# Patient Record
Sex: MALE | Race: WHITE | NOT HISPANIC OR LATINO | Employment: OTHER | URBAN - METROPOLITAN AREA
[De-identification: names, ages, dates, MRNs, and addresses within clinical notes are randomized per-mention and may not be internally consistent; named-entity substitution may affect disease eponyms.]

---

## 2023-01-20 PROBLEM — E11.9 DIABETES MELLITUS (MULTI): Status: ACTIVE | Noted: 2023-01-20

## 2023-01-20 PROBLEM — E78.5 HYPERLIPIDEMIA: Status: ACTIVE | Noted: 2023-01-20

## 2023-01-20 PROBLEM — M10.9 GOUT: Status: ACTIVE | Noted: 2023-01-20

## 2023-01-20 PROBLEM — M25.511 RIGHT SHOULDER PAIN: Status: ACTIVE | Noted: 2023-01-20

## 2023-01-20 PROBLEM — E66.9 OBESITY: Status: ACTIVE | Noted: 2023-01-20

## 2023-01-20 PROBLEM — I10 ESSENTIAL HYPERTENSION: Status: ACTIVE | Noted: 2023-01-20

## 2023-01-20 PROBLEM — B37.9 YEAST INFECTION: Status: ACTIVE | Noted: 2023-01-20

## 2023-01-20 PROBLEM — N53.19 DISORDER OF EJACULATION: Status: ACTIVE | Noted: 2023-01-20

## 2023-01-20 RX ORDER — LISINOPRIL AND HYDROCHLOROTHIAZIDE 10; 12.5 MG/1; MG/1
1.5 TABLET ORAL DAILY
COMMUNITY
Start: 2019-07-23 | End: 2023-07-10 | Stop reason: SDUPTHER

## 2023-01-20 RX ORDER — FLAXSEED OIL 1000 MG
1 CAPSULE ORAL 3 TIMES DAILY
COMMUNITY
Start: 2019-07-23

## 2023-01-20 RX ORDER — ALLOPURINOL 300 MG/1
300 TABLET ORAL DAILY
COMMUNITY
Start: 2019-07-23 | End: 2023-10-04

## 2023-01-20 RX ORDER — METFORMIN HYDROCHLORIDE 500 MG/1
1 TABLET ORAL 2 TIMES DAILY
COMMUNITY
Start: 2019-08-22 | End: 2023-03-27 | Stop reason: SDUPTHER

## 2023-01-20 RX ORDER — GLUCOSAMINE/MSM/CHONDROIT SULF 500-166.6
1 TABLET ORAL
COMMUNITY
Start: 2019-07-23

## 2023-01-20 RX ORDER — ATORVASTATIN CALCIUM 10 MG/1
10 TABLET, FILM COATED ORAL DAILY
COMMUNITY
Start: 2019-08-05 | End: 2023-05-08

## 2023-03-27 DIAGNOSIS — E11.9 TYPE 2 DIABETES MELLITUS WITHOUT COMPLICATION, WITHOUT LONG-TERM CURRENT USE OF INSULIN (MULTI): ICD-10-CM

## 2023-03-27 RX ORDER — METFORMIN HYDROCHLORIDE 500 MG/1
500 TABLET ORAL 2 TIMES DAILY
Qty: 180 TABLET | Refills: 3 | Status: SHIPPED | OUTPATIENT
Start: 2023-03-27 | End: 2024-04-29

## 2023-05-06 DIAGNOSIS — E78.5 HYPERLIPIDEMIA, UNSPECIFIED: ICD-10-CM

## 2023-05-08 RX ORDER — ATORVASTATIN CALCIUM 10 MG/1
10 TABLET, FILM COATED ORAL DAILY
Qty: 90 TABLET | Refills: 3 | Status: SHIPPED | OUTPATIENT
Start: 2023-05-08 | End: 2024-05-06

## 2023-07-10 DIAGNOSIS — I10 ESSENTIAL HYPERTENSION: ICD-10-CM

## 2023-07-10 RX ORDER — LISINOPRIL AND HYDROCHLOROTHIAZIDE 10; 12.5 MG/1; MG/1
1.5 TABLET ORAL DAILY
Qty: 45 TABLET | Refills: 0 | Status: SHIPPED | OUTPATIENT
Start: 2023-07-10 | End: 2023-07-10 | Stop reason: SDUPTHER

## 2023-07-10 RX ORDER — LISINOPRIL AND HYDROCHLOROTHIAZIDE 10; 12.5 MG/1; MG/1
1.5 TABLET ORAL DAILY
Qty: 45 TABLET | Refills: 0 | Status: SHIPPED | OUTPATIENT
Start: 2023-07-10 | End: 2023-08-10 | Stop reason: SDUPTHER

## 2023-08-08 ENCOUNTER — CLINICAL SUPPORT (OUTPATIENT)
Dept: PRIMARY CARE | Facility: CLINIC | Age: 58
End: 2023-08-08
Payer: COMMERCIAL

## 2023-08-08 DIAGNOSIS — I10 BENIGN ESSENTIAL HYPERTENSION: ICD-10-CM

## 2023-08-08 DIAGNOSIS — I10 ESSENTIAL HYPERTENSION: ICD-10-CM

## 2023-08-08 DIAGNOSIS — E03.9 HYPOTHYROIDISM, UNSPECIFIED TYPE: ICD-10-CM

## 2023-08-08 DIAGNOSIS — E11.69 TYPE 2 DIABETES MELLITUS WITH OTHER SPECIFIED COMPLICATION, UNSPECIFIED WHETHER LONG TERM INSULIN USE (MULTI): ICD-10-CM

## 2023-08-08 DIAGNOSIS — D50.9 IRON DEFICIENCY ANEMIA, UNSPECIFIED IRON DEFICIENCY ANEMIA TYPE: ICD-10-CM

## 2023-08-08 LAB
ALANINE AMINOTRANSFERASE (SGPT) (U/L) IN SER/PLAS: 23 U/L (ref 10–52)
ALBUMIN (G/DL) IN SER/PLAS: 4.2 G/DL (ref 3.4–5)
ALKALINE PHOSPHATASE (U/L) IN SER/PLAS: 58 U/L (ref 33–136)
ANION GAP IN SER/PLAS: 13 MMOL/L (ref 10–20)
ASPARTATE AMINOTRANSFERASE (SGOT) (U/L) IN SER/PLAS: 20 U/L (ref 9–39)
BILIRUBIN TOTAL (MG/DL) IN SER/PLAS: 0.5 MG/DL (ref 0–1.2)
CALCIUM (MG/DL) IN SER/PLAS: 9.1 MG/DL (ref 8.6–10.6)
CARBON DIOXIDE, TOTAL (MMOL/L) IN SER/PLAS: 28 MMOL/L (ref 21–32)
CHLORIDE (MMOL/L) IN SER/PLAS: 101 MMOL/L (ref 98–107)
CREATININE (MG/DL) IN SER/PLAS: 1.16 MG/DL (ref 0.5–1.3)
ESTIMATED AVERAGE GLUCOSE FOR HBA1C: 117 MG/DL
GFR MALE: 72 ML/MIN/1.73M2
GLUCOSE (MG/DL) IN SER/PLAS: 95 MG/DL (ref 74–99)
HEMOGLOBIN A1C/HEMOGLOBIN TOTAL IN BLOOD: 5.7 %
POTASSIUM (MMOL/L) IN SER/PLAS: 4.1 MMOL/L (ref 3.5–5.3)
PROTEIN TOTAL: 6.5 G/DL (ref 6.4–8.2)
SODIUM (MMOL/L) IN SER/PLAS: 138 MMOL/L (ref 136–145)
UREA NITROGEN (MG/DL) IN SER/PLAS: 20 MG/DL (ref 6–23)

## 2023-08-08 PROCEDURE — 83036 HEMOGLOBIN GLYCOSYLATED A1C: CPT

## 2023-08-08 PROCEDURE — 80053 COMPREHEN METABOLIC PANEL: CPT

## 2023-08-08 PROCEDURE — 84550 ASSAY OF BLOOD/URIC ACID: CPT

## 2023-08-10 ENCOUNTER — OFFICE VISIT (OUTPATIENT)
Dept: PRIMARY CARE | Facility: CLINIC | Age: 58
End: 2023-08-10
Payer: COMMERCIAL

## 2023-08-10 VITALS
RESPIRATION RATE: 14 BRPM | OXYGEN SATURATION: 96 % | HEART RATE: 60 BPM | DIASTOLIC BLOOD PRESSURE: 64 MMHG | HEIGHT: 68 IN | BODY MASS INDEX: 32.89 KG/M2 | WEIGHT: 217 LBS | SYSTOLIC BLOOD PRESSURE: 112 MMHG

## 2023-08-10 DIAGNOSIS — E11.9 TYPE 2 DIABETES MELLITUS WITHOUT COMPLICATION, WITHOUT LONG-TERM CURRENT USE OF INSULIN (MULTI): ICD-10-CM

## 2023-08-10 DIAGNOSIS — I10 ESSENTIAL HYPERTENSION: ICD-10-CM

## 2023-08-10 DIAGNOSIS — M79.10 MUSCLE PAIN: Primary | ICD-10-CM

## 2023-08-10 DIAGNOSIS — E78.49 OTHER HYPERLIPIDEMIA: ICD-10-CM

## 2023-08-10 PROBLEM — M10.9 GOUT, UNSPECIFIED: Status: ACTIVE | Noted: 2023-08-10

## 2023-08-10 LAB
POC FINGERSTICK BLOOD GLUCOSE: 93 MG/DL (ref 70–100)
URATE (MG/DL) IN SER/PLAS: 5.5 MG/DL (ref 4–7.5)

## 2023-08-10 PROCEDURE — 82962 GLUCOSE BLOOD TEST: CPT | Performed by: INTERNAL MEDICINE

## 2023-08-10 PROCEDURE — 3044F HG A1C LEVEL LT 7.0%: CPT | Performed by: INTERNAL MEDICINE

## 2023-08-10 PROCEDURE — 99214 OFFICE O/P EST MOD 30 MIN: CPT | Performed by: INTERNAL MEDICINE

## 2023-08-10 PROCEDURE — 1036F TOBACCO NON-USER: CPT | Performed by: INTERNAL MEDICINE

## 2023-08-10 PROCEDURE — 3074F SYST BP LT 130 MM HG: CPT | Performed by: INTERNAL MEDICINE

## 2023-08-10 PROCEDURE — 3078F DIAST BP <80 MM HG: CPT | Performed by: INTERNAL MEDICINE

## 2023-08-10 RX ORDER — DICLOFENAC SODIUM 10 MG/G
4 GEL TOPICAL 4 TIMES DAILY
Qty: 100 G | Refills: 1 | Status: ON HOLD | OUTPATIENT
Start: 2023-08-10 | End: 2024-05-10 | Stop reason: ALTCHOICE

## 2023-08-10 RX ORDER — LISINOPRIL AND HYDROCHLOROTHIAZIDE 10; 12.5 MG/1; MG/1
1.5 TABLET ORAL DAILY
Qty: 135 TABLET | Refills: 0 | Status: SHIPPED | OUTPATIENT
Start: 2023-08-10 | End: 2023-12-12

## 2023-08-10 ASSESSMENT — PAIN SCALES - GENERAL: PAINLEVEL: 6

## 2023-08-10 NOTE — PROGRESS NOTES
"Subjective   Patient ID: Delfino Lopez is a 58 y.o. male who presents for Groin Injury (After lifting 3 months ago, still in pain).    HPI   Pt is 59 yo male coming to review blood work and concerns regarding right sided groin pain after lifting something heavy 3 months ago. Pt states pain is reproducible when he lies down flat and sometimes when he walks or lifts heavy things. He does not feel any new bulges or extreme pain. He has not had any recent gout flare ups. He takes advil sometimes for the gout if it does flare up and for the groin pain which helps. Hgb A1c is at 5.7% today pt was worried it would be higher because he forgot to take his Metformin at night for a few days a couple weeks ago due to schedule changes.     Review of Systems   Musculoskeletal:  Positive for gait problem.   All other systems reviewed and are negative.        Objective   /64 (BP Location: Right arm, Patient Position: Sitting, BP Cuff Size: Adult)   Pulse 60   Resp 14   Ht 1.727 m (5' 8\")   Wt 98.4 kg (217 lb)   SpO2 96%   BMI 32.99 kg/m²     Physical Exam  Pt's gait is altered due to groin pain. There is no pain upon palpitation of the rt groin or the rt abd. Pain reproducible while pt lying flat and upon leg extension.     Assessment/Plan   Diagnoses and all orders for this visit:  Muscle pain  Voltaren gel 40 mg on rt groin   Type 2 diabetes mellitus without complication, without long-term current use of insulin (CMS/[facility])  -     POCT Fingerstick Glucose manually resulted  Essential hypertension  Lisinopril refilled for 3 months    Annual physical scheduled for November.       "

## 2023-11-09 ENCOUNTER — CLINICAL SUPPORT (OUTPATIENT)
Dept: PRIMARY CARE | Facility: CLINIC | Age: 58
End: 2023-11-09
Payer: COMMERCIAL

## 2023-11-09 DIAGNOSIS — E78.5 HYPERLIPIDEMIA, UNSPECIFIED HYPERLIPIDEMIA TYPE: ICD-10-CM

## 2023-11-09 DIAGNOSIS — M10.9 GOUT, UNSPECIFIED CAUSE, UNSPECIFIED CHRONICITY, UNSPECIFIED SITE: ICD-10-CM

## 2023-11-09 DIAGNOSIS — I10 BENIGN ESSENTIAL HYPERTENSION: ICD-10-CM

## 2023-11-09 DIAGNOSIS — I10 PRIMARY HYPERTENSION: ICD-10-CM

## 2023-11-09 DIAGNOSIS — E11.69 TYPE 2 DIABETES MELLITUS WITH OTHER SPECIFIED COMPLICATION, UNSPECIFIED WHETHER LONG TERM INSULIN USE (MULTI): ICD-10-CM

## 2023-11-09 DIAGNOSIS — E03.9 HYPOTHYROIDISM, UNSPECIFIED TYPE: ICD-10-CM

## 2023-11-09 DIAGNOSIS — E78.00 ELEVATED LDL CHOLESTEROL LEVEL: ICD-10-CM

## 2023-11-09 DIAGNOSIS — Z12.5 SCREENING PSA (PROSTATE SPECIFIC ANTIGEN): ICD-10-CM

## 2023-11-09 DIAGNOSIS — D50.9 IRON DEFICIENCY ANEMIA, UNSPECIFIED IRON DEFICIENCY ANEMIA TYPE: ICD-10-CM

## 2023-11-09 DIAGNOSIS — I10 ESSENTIAL HYPERTENSION: ICD-10-CM

## 2023-11-09 DIAGNOSIS — Z00.00 ENCOUNTER FOR ANNUAL PHYSICAL EXAM: ICD-10-CM

## 2023-11-09 LAB
25(OH)D3 SERPL-MCNC: 69 NG/ML (ref 30–100)
ALBUMIN SERPL BCP-MCNC: 4.3 G/DL (ref 3.4–5)
ALP SERPL-CCNC: 52 U/L (ref 33–136)
ALT SERPL W P-5'-P-CCNC: 27 U/L (ref 10–52)
ANION GAP SERPL CALC-SCNC: 13 MMOL/L (ref 10–20)
AST SERPL W P-5'-P-CCNC: 23 U/L (ref 9–39)
BILIRUB SERPL-MCNC: 0.4 MG/DL (ref 0–1.2)
BUN SERPL-MCNC: 26 MG/DL (ref 6–23)
CALCIUM SERPL-MCNC: 9.3 MG/DL (ref 8.6–10.6)
CHLORIDE SERPL-SCNC: 100 MMOL/L (ref 98–107)
CHOLEST SERPL-MCNC: 125 MG/DL (ref 0–199)
CHOLESTEROL/HDL RATIO: 2.9
CO2 SERPL-SCNC: 30 MMOL/L (ref 21–32)
CREAT SERPL-MCNC: 1.18 MG/DL (ref 0.5–1.3)
ERYTHROCYTE [DISTWIDTH] IN BLOOD BY AUTOMATED COUNT: 14.2 % (ref 11.5–14.5)
EST. AVERAGE GLUCOSE BLD GHB EST-MCNC: 123 MG/DL
GFR SERPL CREATININE-BSD FRML MDRD: 71 ML/MIN/1.73M*2
GLUCOSE SERPL-MCNC: 86 MG/DL (ref 74–99)
HBA1C MFR BLD: 5.9 %
HCT VFR BLD AUTO: 46.4 % (ref 41–52)
HDLC SERPL-MCNC: 43.5 MG/DL
HGB BLD-MCNC: 14.7 G/DL (ref 13.5–17.5)
LDLC SERPL CALC-MCNC: 53 MG/DL
MCH RBC QN AUTO: 28.4 PG (ref 26–34)
MCHC RBC AUTO-ENTMCNC: 31.7 G/DL (ref 32–36)
MCV RBC AUTO: 90 FL (ref 80–100)
NON HDL CHOLESTEROL: 82 MG/DL (ref 0–149)
NRBC BLD-RTO: 0 /100 WBCS (ref 0–0)
PLATELET # BLD AUTO: 232 X10*3/UL (ref 150–450)
POTASSIUM SERPL-SCNC: 4.4 MMOL/L (ref 3.5–5.3)
PROT SERPL-MCNC: 6.9 G/DL (ref 6.4–8.2)
PSA SERPL-MCNC: 1.56 NG/ML
RBC # BLD AUTO: 5.18 X10*6/UL (ref 4.5–5.9)
SODIUM SERPL-SCNC: 139 MMOL/L (ref 136–145)
TRIGL SERPL-MCNC: 143 MG/DL (ref 0–149)
TSH SERPL-ACNC: 2.56 MIU/L (ref 0.44–3.98)
URATE SERPL-MCNC: 4.9 MG/DL (ref 4–7.5)
VLDL: 29 MG/DL (ref 0–40)
WBC # BLD AUTO: 7.6 X10*3/UL (ref 4.4–11.3)

## 2023-11-09 PROCEDURE — 85027 COMPLETE CBC AUTOMATED: CPT

## 2023-11-09 PROCEDURE — 84550 ASSAY OF BLOOD/URIC ACID: CPT

## 2023-11-09 PROCEDURE — 80053 COMPREHEN METABOLIC PANEL: CPT

## 2023-11-09 PROCEDURE — 36415 COLL VENOUS BLD VENIPUNCTURE: CPT

## 2023-11-09 PROCEDURE — 83036 HEMOGLOBIN GLYCOSYLATED A1C: CPT

## 2023-11-09 PROCEDURE — 84153 ASSAY OF PSA TOTAL: CPT

## 2023-11-09 PROCEDURE — 80061 LIPID PANEL: CPT

## 2023-11-09 PROCEDURE — 82306 VITAMIN D 25 HYDROXY: CPT

## 2023-11-09 PROCEDURE — 84443 ASSAY THYROID STIM HORMONE: CPT

## 2023-11-28 ENCOUNTER — OFFICE VISIT (OUTPATIENT)
Dept: PRIMARY CARE | Facility: CLINIC | Age: 58
End: 2023-11-28
Payer: COMMERCIAL

## 2023-11-28 VITALS
HEIGHT: 67 IN | HEART RATE: 70 BPM | SYSTOLIC BLOOD PRESSURE: 116 MMHG | OXYGEN SATURATION: 94 % | BODY MASS INDEX: 35.16 KG/M2 | RESPIRATION RATE: 14 BRPM | DIASTOLIC BLOOD PRESSURE: 77 MMHG | WEIGHT: 224 LBS

## 2023-11-28 DIAGNOSIS — E66.01 CLASS 2 SEVERE OBESITY DUE TO EXCESS CALORIES WITH SERIOUS COMORBIDITY IN ADULT, UNSPECIFIED BMI (MULTI): ICD-10-CM

## 2023-11-28 DIAGNOSIS — R10.31 GROIN PAIN, RIGHT: ICD-10-CM

## 2023-11-28 DIAGNOSIS — E08.00 DIABETES MELLITUS DUE TO UNDERLYING CONDITION WITH HYPEROSMOLARITY WITHOUT COMA, WITHOUT LONG-TERM CURRENT USE OF INSULIN (MULTI): Primary | ICD-10-CM

## 2023-11-28 DIAGNOSIS — Z23 FLU VACCINE NEED: ICD-10-CM

## 2023-11-28 DIAGNOSIS — M10.00 ACUTE IDIOPATHIC GOUT, UNSPECIFIED SITE: ICD-10-CM

## 2023-11-28 DIAGNOSIS — E78.49 OTHER HYPERLIPIDEMIA: ICD-10-CM

## 2023-11-28 DIAGNOSIS — I10 ESSENTIAL HYPERTENSION: ICD-10-CM

## 2023-11-28 DIAGNOSIS — M79.10 MUSCLE PAIN: ICD-10-CM

## 2023-11-28 DIAGNOSIS — Z00.00 ANNUAL PHYSICAL EXAM: ICD-10-CM

## 2023-11-28 LAB
POC APPEARANCE, URINE: CLEAR
POC BILIRUBIN, URINE: NEGATIVE
POC BLOOD, URINE: NEGATIVE
POC COLOR, URINE: YELLOW
POC FECAL OCCULT BLOOD: NEGATIVE
POC FINGERSTICK BLOOD GLUCOSE: 104 MG/DL (ref 70–100)
POC GLUCOSE, URINE: NEGATIVE MG/DL
POC KETONES, URINE: NEGATIVE MG/DL
POC LEUKOCYTES, URINE: NEGATIVE
POC NITRITE,URINE: NEGATIVE
POC PH, URINE: 6 PH
POC PROTEIN, URINE: NEGATIVE MG/DL
POC SPECIFIC GRAVITY, URINE: 1.01
POC UROBILINOGEN, URINE: 0.2 EU/DL

## 2023-11-28 PROCEDURE — 3078F DIAST BP <80 MM HG: CPT | Performed by: INTERNAL MEDICINE

## 2023-11-28 PROCEDURE — 90686 IIV4 VACC NO PRSV 0.5 ML IM: CPT | Performed by: INTERNAL MEDICINE

## 2023-11-28 PROCEDURE — 82270 OCCULT BLOOD FECES: CPT | Performed by: INTERNAL MEDICINE

## 2023-11-28 PROCEDURE — 93000 ELECTROCARDIOGRAM COMPLETE: CPT | Performed by: INTERNAL MEDICINE

## 2023-11-28 PROCEDURE — 99214 OFFICE O/P EST MOD 30 MIN: CPT | Performed by: INTERNAL MEDICINE

## 2023-11-28 PROCEDURE — 90471 IMMUNIZATION ADMIN: CPT | Performed by: INTERNAL MEDICINE

## 2023-11-28 PROCEDURE — 3044F HG A1C LEVEL LT 7.0%: CPT | Performed by: INTERNAL MEDICINE

## 2023-11-28 PROCEDURE — 99396 PREV VISIT EST AGE 40-64: CPT | Performed by: INTERNAL MEDICINE

## 2023-11-28 PROCEDURE — 81002 URINALYSIS NONAUTO W/O SCOPE: CPT | Performed by: INTERNAL MEDICINE

## 2023-11-28 PROCEDURE — 82962 GLUCOSE BLOOD TEST: CPT | Performed by: INTERNAL MEDICINE

## 2023-11-28 PROCEDURE — 3074F SYST BP LT 130 MM HG: CPT | Performed by: INTERNAL MEDICINE

## 2023-11-28 PROCEDURE — 1036F TOBACCO NON-USER: CPT | Performed by: INTERNAL MEDICINE

## 2023-11-28 PROCEDURE — 3048F LDL-C <100 MG/DL: CPT | Performed by: INTERNAL MEDICINE

## 2023-11-28 ASSESSMENT — ENCOUNTER SYMPTOMS
DEPRESSION: 0
LOSS OF SENSATION IN FEET: 0
OCCASIONAL FEELINGS OF UNSTEADINESS: 0

## 2023-11-28 ASSESSMENT — LIFESTYLE VARIABLES
SKIP TO QUESTIONS 9-10: 1
AUDIT-C TOTAL SCORE: 0
HOW OFTEN DO YOU HAVE SIX OR MORE DRINKS ON ONE OCCASION: NEVER
HOW OFTEN DO YOU HAVE A DRINK CONTAINING ALCOHOL: NEVER
HOW MANY STANDARD DRINKS CONTAINING ALCOHOL DO YOU HAVE ON A TYPICAL DAY: PATIENT DOES NOT DRINK

## 2023-11-28 ASSESSMENT — PATIENT HEALTH QUESTIONNAIRE - PHQ9
SUM OF ALL RESPONSES TO PHQ9 QUESTIONS 1 AND 2: 0
2. FEELING DOWN, DEPRESSED OR HOPELESS: NOT AT ALL
1. LITTLE INTEREST OR PLEASURE IN DOING THINGS: NOT AT ALL
SUM OF ALL RESPONSES TO PHQ9 QUESTIONS 1 & 2: 0
2. FEELING DOWN, DEPRESSED OR HOPELESS: NOT AT ALL
1. LITTLE INTEREST OR PLEASURE IN DOING THINGS: NOT AT ALL

## 2023-11-28 ASSESSMENT — COLUMBIA-SUICIDE SEVERITY RATING SCALE - C-SSRS
1. IN THE PAST MONTH, HAVE YOU WISHED YOU WERE DEAD OR WISHED YOU COULD GO TO SLEEP AND NOT WAKE UP?: NO
2. HAVE YOU ACTUALLY HAD ANY THOUGHTS OF KILLING YOURSELF?: NO
6. HAVE YOU EVER DONE ANYTHING, STARTED TO DO ANYTHING, OR PREPARED TO DO ANYTHING TO END YOUR LIFE?: NO

## 2023-11-28 ASSESSMENT — PAIN SCALES - GENERAL: PAINLEVEL: 6

## 2023-11-28 NOTE — ASSESSMENT & PLAN NOTE
Patient has this pain in the right groin area going down the thigh and he had it for quite some time over 9 months not getting any better every time he moves around to climb the stairs it hurts and he is worried about something going like an injury to his tendon since he has a lot of heavy weight lifting so patient will be sent for MRI of the right groin area.

## 2023-11-28 NOTE — PROGRESS NOTES
"ANNUAL WELLNESS VISIT    Subjective :  Chief Complaint: Delfino Lopez is an 58 y.o. male here for an annual wellness visit and general medical care and f/u.     HPI:  Follow-up annual wellness exam        Objective   /77 (BP Location: Right arm, Patient Position: Sitting, BP Cuff Size: Large adult)   Pulse 70   Resp 14   Ht 1.702 m (5' 7\")   Wt 102 kg (224 lb)   SpO2 94%   BMI 35.08 kg/m²     Physical Exam  Vitals reviewed.   Constitutional:       Appearance: Normal appearance.   HENT:      Head: Normocephalic and atraumatic.   Cardiovascular:      Rate and Rhythm: Normal rate and regular rhythm.   Pulmonary:      Effort: Pulmonary effort is normal.      Breath sounds: Normal breath sounds.   Abdominal:      General: Bowel sounds are normal.      Palpations: Abdomen is soft.   Musculoskeletal:      Cervical back: Neck supple.   Skin:     General: Skin is warm and dry.   Neurological:      General: No focal deficit present.      Mental Status: He is alert.   Psychiatric:         Mood and Affect: Mood normal.         Behavior: Behavior is cooperative.         Imaging:  No results found.     Labs reviewed:    Lab Results   Component Value Date    WBC 7.6 11/09/2023    HGB 14.7 11/09/2023    HCT 46.4 11/09/2023     11/09/2023    CHOL 125 11/09/2023    TRIG 143 11/09/2023    HDL 43.5 11/09/2023    ALT 27 11/09/2023    AST 23 11/09/2023     11/09/2023    K 4.4 11/09/2023     11/09/2023    CREATININE 1.18 11/09/2023    BUN 26 (H) 11/09/2023    CO2 30 11/09/2023    TSH 2.56 11/09/2023    PSA 0.74 09/24/2020    HGBA1C 5.9 (H) 11/09/2023       Past Medical, Surgical, and Family History reviewed and updated in chart.    I have reviewed and reconciled the medication list with the patient today.   Current Outpatient Medications:     allopurinol (Zyloprim) 300 mg tablet, TAKE 1 TABLET BY MOUTH EVERY DAY, Disp: 90 tablet, Rfl: 3    atorvastatin (Lipitor) 10 mg tablet, Take 1 tablet (10 mg) by mouth " once daily., Disp: 90 tablet, Rfl: 3    diclofenac sodium (Voltaren) 1 % gel gel, Apply 1 Application topically 4 times a day., Disp: 100 g, Rfl: 1    flaxseed oiL 1,000 mg capsule, Take 1 capsule (1,000 mg) by mouth 3 times a day., Disp: , Rfl:     lisinopriL-hydrochlorothiazide 10-12.5 mg tablet, Take 1.5 tablets by mouth once daily. Take 1.5 tablet daily, Disp: 135 tablet, Rfl: 0    metFORMIN (Glucophage) 500 mg tablet, Take 1 tablet (500 mg) by mouth in the morning and 1 tablet (500 mg) before bedtime., Disp: 180 tablet, Rfl: 3    multivitamin/iron/folic acid (CENTRUM ORAL), Take by mouth., Disp: , Rfl:     omega 3-dha-epa-fish oil 360 mg-108 mg- 180 mg-1,200 mg capsule, Take 1 capsule by mouth 3 times a day with meals., Disp: , Rfl:     turmeric (CURCUMIN MISC), Take by mouth. TUMERIC CURCUMIN ORAL CAPSULE, Disp: , Rfl:      List of current healthcare providers:  Patient Care Team:  Liu Maxwell MD as PCP - General  Liu Maxwell MD as PCP - [facility] PCP  Liu Maxwell MD as PCP - [facility] Medicaid PCP     HRA:  Over the past 2 weeks, how often have you been bothered by any of the following problems?  Little interest or pleasure in doing things: Not at all  Feeling down, depressed, or hopeless: Not at all  Patient Health Questionnaire-2 Score: 0    Steadi Fall Risk  One or more falls in the last year? No  How many Times?    Was the patient injured in the fall?    Has trouble stepping onto curb? No  Advised to use a cane or walker to get around safely? Yes  Often has to rush to toilet? No  Feels unsteady when walking? No  Has lost some feeling in feet? No  Often feels sad or depressed? No  Steadies self on furniture while walking at home? No  Takes medicine that makes them feel lightheaded or more tired than usual? No  Worried about Falling? No  Takes medicine to sleep or improve mood? No  Needs to push with hands when rising from a chair? No                                          Assessment/Plan  :  Problem List Items Addressed This Visit       Diabetes mellitus (CMS/[facility]) - Primary     Continue metformin  Low-carb diet         Essential hypertension     Continue lisinopril.         Gout    Hyperlipidemia     Statin  Low-fat diet         Obesity    Muscle pain    Annual physical exam    Relevant Orders    POCT Fingerstick Glucose manually resulted (Completed)    POCT UA (nonautomated) manually resulted (Completed)    POCT Fecal occult blood-guiac methodology screening manually resulted (Completed)    ECG 12 lead (Clinic Performed) (Completed)    Flu vaccine need    Relevant Orders    Flu vaccine (IIV4) age 6 months and greater, preservative free (Completed)    Groin pain, right     Patient has this pain in the right groin area going down the thigh and he had it for quite some time over 9 months not getting any better every time he moves around to climb the stairs it hurts and he is worried about something going like an injury to his tendon since he has a lot of heavy weight lifting so patient will be sent for MRI of the right groin area.         Relevant Orders    MR pelvis wo IV contrast     The following health maintenance schedule was reviewed with the patient and provided in printed form in the after visit summary:  Health Maintenance   Topic Date Due    Yearly Adult Physical  Never done    Colorectal Cancer Screening  Never done    MMR Vaccines (1 of 1 - Standard series) Never done    Pneumococcal Vaccine: Pediatrics (0 to 5 Years) and At-Risk Patients (6 to 64 Years) (1 - PCV) Never done    Diabetes: Foot Exam  Never done    Diabetes: Retinopathy Screening  Never done    Zoster Vaccines (1 of 2) Never done    DTaP/Tdap/Td Vaccines (2 - Td or Tdap) 07/09/2018    Diabetes: Urine Protein Screening  09/24/2021    COVID-19 Vaccine (4 - Moderna series) 01/28/2022    Diabetes: Hemoglobin A1C  02/09/2024    TSH Level  11/09/2024    Lipid Panel  11/09/2024    Influenza Vaccine  Completed    HIV Screening   Completed    Hepatitis C Screening  Completed    HIB Vaccines  Aged Out    Hepatitis B Vaccines  Aged Out    IPV Vaccines  Aged Out    Hepatitis A Vaccines  Aged Out    Meningococcal Vaccine  Aged Out    Rotavirus Vaccines  Aged Out    HPV Vaccines  Aged Out       Advance Care Planning   Living well           Orders Placed This Encounter   Procedures    MR pelvis wo IV contrast     Standing Status:   Future     Standing Expiration Date:   11/28/2024     Order Specific Question:   Reason for exam:     Answer:   groin injury and pain for 9 months.     Order Specific Question:   Does the patient have a Cochlear Implant, Pacemaker, Defibrilator, Pacing Wire, Brain Aneurysm Clip, Implanted Nerve or Bone Graft Simulator, Implanted Breast Tissue Expander, Glucose Monitor or Neulasta Device?     Answer:   No     Order Specific Question:   Radiologist to Determine Optimal Study     Answer:   Yes     Order Specific Question:   Release result to MyChart     Answer:   Immediate [1]     Order Specific Question:   Is this exam part of a Research Study? If Yes, link this order to the research study     Answer:   No    Flu vaccine (IIV4) age 6 months and greater, preservative free    POCT Fingerstick Glucose manually resulted     Order Specific Question:   Release result to MyChart     Answer:   Immediate [1]    POCT UA (nonautomated) manually resulted     Order Specific Question:   Release result to MyChart     Answer:   Immediate [1]    POCT Fecal occult blood-guiac methodology screening manually resulted     Order Specific Question:   Test Card Expiration Date     Answer:   12/25/2025     Order Specific Question:   Lot Number     Answer:   [id number]     Order Specific Question:   Positive Control:     Answer:   Negative     Order Specific Question:   Negative Control:     Answer:   Negative     Order Specific Question:   Release result to MyChart     Answer:   Immediate [1]    ECG 12 lead (Clinic Performed)       Continue  current medications as listed  Follow up in after the MRIs done patient to follow-up

## 2023-12-01 ENCOUNTER — APPOINTMENT (OUTPATIENT)
Dept: PRIMARY CARE | Facility: CLINIC | Age: 58
End: 2023-12-01
Payer: COMMERCIAL

## 2023-12-07 DIAGNOSIS — I10 ESSENTIAL HYPERTENSION: ICD-10-CM

## 2023-12-11 DIAGNOSIS — I10 ESSENTIAL HYPERTENSION: ICD-10-CM

## 2023-12-12 RX ORDER — LISINOPRIL AND HYDROCHLOROTHIAZIDE 10; 12.5 MG/1; MG/1
1.5 TABLET ORAL DAILY
Qty: 135 TABLET | Refills: 0 | Status: SHIPPED | OUTPATIENT
Start: 2023-12-12

## 2023-12-12 RX ORDER — LISINOPRIL AND HYDROCHLOROTHIAZIDE 10; 12.5 MG/1; MG/1
1.5 TABLET ORAL DAILY
Qty: 135 TABLET | Refills: 3 | Status: ON HOLD | OUTPATIENT
Start: 2023-12-12 | End: 2024-05-10 | Stop reason: ALTCHOICE

## 2023-12-20 ENCOUNTER — APPOINTMENT (OUTPATIENT)
Dept: RADIOLOGY | Facility: CLINIC | Age: 58
End: 2023-12-20
Payer: COMMERCIAL

## 2023-12-27 DIAGNOSIS — R10.31 GROIN PAIN, RIGHT: ICD-10-CM

## 2023-12-28 ENCOUNTER — TELEPHONE (OUTPATIENT)
Dept: PRIMARY CARE | Facility: CLINIC | Age: 58
End: 2023-12-28
Payer: COMMERCIAL

## 2023-12-28 ENCOUNTER — APPOINTMENT (OUTPATIENT)
Dept: RADIOLOGY | Facility: CLINIC | Age: 58
End: 2023-12-28
Payer: COMMERCIAL

## 2023-12-28 DIAGNOSIS — R10.31 GROIN PAIN, RIGHT: Primary | ICD-10-CM

## 2024-01-03 ENCOUNTER — OFFICE VISIT (OUTPATIENT)
Dept: ORTHOPEDIC SURGERY | Facility: CLINIC | Age: 59
End: 2024-01-03
Payer: COMMERCIAL

## 2024-01-03 ENCOUNTER — ANCILLARY PROCEDURE (OUTPATIENT)
Dept: RADIOLOGY | Facility: CLINIC | Age: 59
End: 2024-01-03
Payer: COMMERCIAL

## 2024-01-03 VITALS — BODY MASS INDEX: 35.16 KG/M2 | HEIGHT: 67 IN | WEIGHT: 224 LBS

## 2024-01-03 DIAGNOSIS — R10.31 GROIN PAIN, RIGHT: ICD-10-CM

## 2024-01-03 DIAGNOSIS — M16.10 ARTHRITIS OF HIP: Primary | ICD-10-CM

## 2024-01-03 PROCEDURE — 99204 OFFICE O/P NEW MOD 45 MIN: CPT | Performed by: ORTHOPAEDIC SURGERY

## 2024-01-03 PROCEDURE — 73502 X-RAY EXAM HIP UNI 2-3 VIEWS: CPT | Mod: RIGHT SIDE | Performed by: RADIOLOGY

## 2024-01-03 PROCEDURE — 73502 X-RAY EXAM HIP UNI 2-3 VIEWS: CPT | Mod: RT

## 2024-01-03 PROCEDURE — 1036F TOBACCO NON-USER: CPT | Performed by: ORTHOPAEDIC SURGERY

## 2024-01-03 ASSESSMENT — PAIN SCALES - GENERAL: PAINLEVEL_OUTOF10: 3

## 2024-01-03 ASSESSMENT — PAIN - FUNCTIONAL ASSESSMENT: PAIN_FUNCTIONAL_ASSESSMENT: 0-10

## 2024-01-03 NOTE — PROGRESS NOTES
58-year-old male here for right hip pain.  Has had pain in the right hip over the past 7 months.  No specific injury.  Was working the garage moving a lot of boxes and noticed some pain in the right groin and thigh the next day.  He has been using a crutch for the last month.  He is self-employed doing odd jobs.  He thinks he may have pulled a muscle.  Occasionally gets radiation down towards the knee.  Was aggravated earlier in December.  Was taking Advil but has not taken it for the last week.  Has type 2 diabetes on metformin.  He is a non-smoker.    WD/WN overweight male BMI 35  A+O X3  No lymphedema  Inspection of both hips shows no deformity.   No apparent pain with log roll.   Good strength against resistance with flexion, extension, abduction and adduction.  Mild groin pain with resisted hip flexion.  Good pulses.   Sensation intact to light touch.   Symmetric reflexes.    I personally reviewed the following radiographic exams: AP pelvis and right hip shows mild to moderate right hip arthrosis.  No acute changes.  No AVN.  A1c November 2023 5.9.    Assessment: Right hip arthrosis    Plan: Discussed nonoperative and operative options in detail.   Risk and benefits discussed in detail. All questions answered today.  Recovery timeline and expectations discussed in detail.  Likely aggravated right hip arthrosis months ago.  Got relief with Advil.  Discussed possible hip injection for specific treatment of the right hip.  Discussed physical therapy to work on strengthening in the muscles about the hip.  Discussed possible hip replacement the future.  Consider advanced imaging if severe pain continues as x-rays are not that traumatic.

## 2024-01-12 ENCOUNTER — APPOINTMENT (OUTPATIENT)
Dept: ORTHOPEDIC SURGERY | Facility: CLINIC | Age: 59
End: 2024-01-12
Payer: COMMERCIAL

## 2024-01-23 ENCOUNTER — OFFICE VISIT (OUTPATIENT)
Dept: ORTHOPEDIC SURGERY | Facility: HOSPITAL | Age: 59
End: 2024-01-23
Payer: COMMERCIAL

## 2024-01-23 VITALS — HEIGHT: 67 IN | WEIGHT: 225 LBS | BODY MASS INDEX: 35.31 KG/M2

## 2024-01-23 DIAGNOSIS — R10.31 GROIN PAIN, RIGHT: ICD-10-CM

## 2024-01-23 DIAGNOSIS — M16.11 PRIMARY LOCALIZED OSTEOARTHROSIS OF RIGHT HIP: ICD-10-CM

## 2024-01-23 PROCEDURE — 2500000005 HC RX 250 GENERAL PHARMACY W/O HCPCS: Performed by: FAMILY MEDICINE

## 2024-01-23 PROCEDURE — 2500000004 HC RX 250 GENERAL PHARMACY W/ HCPCS (ALT 636 FOR OP/ED): Performed by: FAMILY MEDICINE

## 2024-01-23 PROCEDURE — 1036F TOBACCO NON-USER: CPT | Performed by: FAMILY MEDICINE

## 2024-01-23 PROCEDURE — 99214 OFFICE O/P EST MOD 30 MIN: CPT | Performed by: FAMILY MEDICINE

## 2024-01-23 PROCEDURE — 20611 DRAIN/INJ JOINT/BURSA W/US: CPT | Performed by: FAMILY MEDICINE

## 2024-01-23 PROCEDURE — 99204 OFFICE O/P NEW MOD 45 MIN: CPT | Performed by: FAMILY MEDICINE

## 2024-01-23 RX ADMIN — LIDOCAINE HYDROCHLORIDE 7 ML: 10 INJECTION, SOLUTION EPIDURAL; INFILTRATION; INTRACAUDAL; PERINEURAL at 14:58

## 2024-01-23 RX ADMIN — METHYLPREDNISOLONE ACETATE 80 MG: 40 INJECTION, SUSPENSION INTRALESIONAL; INTRAMUSCULAR; INTRASYNOVIAL; SOFT TISSUE at 14:58

## 2024-01-23 RX ADMIN — ROPIVACAINE HYDROCHLORIDE 4 ML: 5 INJECTION, SOLUTION EPIDURAL; INFILTRATION; PERINEURAL at 14:58

## 2024-01-23 ASSESSMENT — PAIN SCALES - GENERAL: PAINLEVEL_OUTOF10: 5 - MODERATE PAIN

## 2024-01-23 ASSESSMENT — PAIN - FUNCTIONAL ASSESSMENT: PAIN_FUNCTIONAL_ASSESSMENT: 0-10

## 2024-01-23 NOTE — LETTER
January 29, 2024     Liu Maxwell MD  [address]  [facility], [address]  [facility] [address]    Patient: Delfino Lopez   YOB: 1965   Date of Visit: 1/23/2024       Dear Dr. Liu Maxwell MD:    Thank you for referring Delfino Lopez to me for evaluation. Below are my notes for this consultation.  If you have questions, please do not hesitate to call me. I look forward to following your patient along with you.       Sincerely,     Miki Shah MD      CC: Nate Holt MD  ______________________________________________________________________________________    Patient ID: Delfino Lopez is a 58 y.o. male.    L Inj/Asp: R hip joint on 1/23/2024 2:58 PM  Indications: pain  Details: 20 G needle, ultrasound-guided anterior approach  Medications: 80 mg methylPREDNISolone acetate 40 mg/mL; 7 mL lidocaine PF 10 mg/mL (1 %); 4 mL ropivacaine  Procedure, treatment alternatives, risks and benefits explained, specific risks discussed. Consent was given by the patient. Immediately prior to procedure a time out was called to verify the correct patient, procedure, equipment, support staff and site/side marked as required. Patient was prepped and draped in the usual sterile fashion.       Patient is here for right hip pain had been referred from Dr Holt's office   Sports Medicine Office Note    Today's Date:  01/23/2024     HPI: Delfino Lopez is a 58 y.o. self-employed in [employer] and presents today for evaluation of right hip pain for possible cortisone injection upon referral by Dr. Holt.    On 1/23/2024, he complains of chronic right hip pain for the past 7+ months.  He denies injury or trauma.  His symptoms began after working at home in the garage when he was moving lots of boxes and had pain the following day in the anterior hip and groin.  He is needed a crutch for assisted ambulation over the past 1 month.  He is self-employed doing remodeling projects.  He occasionally gets pain  radiating down the anterior thigh but not into the lower leg.  His type 2 diabetes with oral medications.  He denies problems in the opposite hip.  He denies radicular numbness or tingling.  He has never had a cortisone shot before.    He has no other complaints.    Physical Examination:     The RIGHT hip and pelvis are without obvious signs of acute bony deformity, swelling, erythema, ecchymosis or instability. Active and passive range of motion are mild to moderately limited and painful. Log roll is positive. Straight leg raise test is negative. Ashtyn is positive. Crossover is negative. Hip strength is weak as compared to the opposite hip. The opposite hip is otherwise nontender and stable. Gait is antalgic and tandem.    Imaging:  Radiographs of the right hip recently obtained were reviewed and revealed mild to moderate arthrosis.  There are no signs of acute fractures or dislocations.  The studies were reviewed by me personally in the office today.    === 01/03/24 ===  XR HIP RIGHT WITH PELVIS WHEN PERFORMED 2 OR 3 VIEWS  - Impression -  Mild degenerative changes in the right hip and lumbar spine.  Signed by: Trenton Brooks 1/5/2024 9:16 AM    Procedure:  After consent was obtained, anterior right hip was prepped in a sterile fashion. Ultrasound guidance was used to help insure proper needle placement into the hip joint, decrease patient discomfort, and decrease collateral damage. The joint was visualized and Depo-Medrol 80 mg with lidocaine 4 mL & ropivacaine 4 mL were injected without any complications. Ultrasound images were saved on an internal file for later reference. The patient tolerated the procedure well and the area was cleaned and bandaged.    Problem List Items Addressed This Visit             ICD-10-CM    Groin pain, right R10.31    Primary localized osteoarthrosis of right hip M16.11    Relevant Orders    Point of Care Ultrasound (Completed)       Assessment and Plan:     We reviewed the exam  and x-ray findings and discussed the conservative and surgical treatment options. We agreed to a diagnostic and hopefully therapeutic cortisone injection into the right hip joint.  He tolerated this well. Activity modifications were reviewed.  He will keep any scheduled follow-up with Dr. Holt. I will see him back in 4 weeks or sooner as needed.  If he has no improvement, we may consider referral to Dr. Carroll for general surgery evaluation for hernia.    **This note was dictated using Dragon speech recognition software and was not corrected for spelling or grammatical errors**.    Miki Shah MD  Sports Medicine Specialist  [facility] [place]

## 2024-01-23 NOTE — PROGRESS NOTES
Patient ID: Delfino Lopez is a 58 y.o. male.    L Inj/Asp: R hip joint on 1/23/2024 2:58 PM  Indications: pain  Details: 20 G needle, ultrasound-guided anterior approach  Medications: 80 mg methylPREDNISolone acetate 40 mg/mL; 7 mL lidocaine PF 10 mg/mL (1 %); 4 mL ropivacaine  Procedure, treatment alternatives, risks and benefits explained, specific risks discussed. Consent was given by the patient. Immediately prior to procedure a time out was called to verify the correct patient, procedure, equipment, support staff and site/side marked as required. Patient was prepped and draped in the usual sterile fashion.       Patient is here for right hip pain had been referred from Dr Holt's office   Sports Medicine Office Note    Today's Date:  01/23/2024     HPI: Delfino Lopez is a 58 y.o. self-employed in [employer] and presents today for evaluation of right hip pain for possible cortisone injection upon referral by Dr. Holt.    On 1/23/2024, he complains of chronic right hip pain for the past 7+ months.  He denies injury or trauma.  His symptoms began after working at home in the garage when he was moving lots of boxes and had pain the following day in the anterior hip and groin.  He is needed a crutch for assisted ambulation over the past 1 month.  He is self-employed doing remodeling projects.  He occasionally gets pain radiating down the anterior thigh but not into the lower leg.  His type 2 diabetes with oral medications.  He denies problems in the opposite hip.  He denies radicular numbness or tingling.  He has never had a cortisone shot before.    He has no other complaints.    Physical Examination:     The RIGHT hip and pelvis are without obvious signs of acute bony deformity, swelling, erythema, ecchymosis or instability. Active and passive range of motion are mild to moderately limited and painful. Log roll is positive. Straight leg raise test is negative. Ashtyn is positive. Crossover is negative. Hip  strength is weak as compared to the opposite hip. The opposite hip is otherwise nontender and stable. Gait is antalgic and tandem.    Imaging:  Radiographs of the right hip recently obtained were reviewed and revealed mild to moderate arthrosis.  There are no signs of acute fractures or dislocations.  The studies were reviewed by me personally in the office today.    === 01/03/24 ===  XR HIP RIGHT WITH PELVIS WHEN PERFORMED 2 OR 3 VIEWS  - Impression -  Mild degenerative changes in the right hip and lumbar spine.  Signed by: Trenton Brooks 1/5/2024 9:16 AM    Procedure:  After consent was obtained, anterior right hip was prepped in a sterile fashion. Ultrasound guidance was used to help insure proper needle placement into the hip joint, decrease patient discomfort, and decrease collateral damage. The joint was visualized and Depo-Medrol 80 mg with lidocaine 4 mL & ropivacaine 4 mL were injected without any complications. Ultrasound images were saved on an internal file for later reference. The patient tolerated the procedure well and the area was cleaned and bandaged.    Problem List Items Addressed This Visit             ICD-10-CM    Groin pain, right R10.31    Primary localized osteoarthrosis of right hip M16.11    Relevant Orders    Point of Care Ultrasound (Completed)       Assessment and Plan:     We reviewed the exam and x-ray findings and discussed the conservative and surgical treatment options. We agreed to a diagnostic and hopefully therapeutic cortisone injection into the right hip joint.  He tolerated this well. Activity modifications were reviewed.  He will keep any scheduled follow-up with Dr. Holt. I will see him back in 4 weeks or sooner as needed.  If he has no improvement, we may consider referral to Dr. Carroll for general surgery evaluation for hernia.    **This note was dictated using Dragon speech recognition software and was not corrected for spelling or grammatical errors**.    Miki CRAIG  MD Pablo  Sports Medicine Specialist  [facility]"" [facility] [place]

## 2024-01-29 RX ORDER — LIDOCAINE HYDROCHLORIDE 10 MG/ML
7 INJECTION, SOLUTION EPIDURAL; INFILTRATION; INTRACAUDAL; PERINEURAL
Status: COMPLETED | OUTPATIENT
Start: 2024-01-23 | End: 2024-01-23

## 2024-01-29 RX ORDER — ROPIVACAINE HYDROCHLORIDE 5 MG/ML
4 INJECTION, SOLUTION EPIDURAL; INFILTRATION; PERINEURAL
Status: COMPLETED | OUTPATIENT
Start: 2024-01-23 | End: 2024-01-23

## 2024-01-29 RX ORDER — METHYLPREDNISOLONE ACETATE 40 MG/ML
80 INJECTION, SUSPENSION INTRA-ARTICULAR; INTRALESIONAL; INTRAMUSCULAR; SOFT TISSUE
Status: COMPLETED | OUTPATIENT
Start: 2024-01-23 | End: 2024-01-23

## 2024-02-21 ENCOUNTER — OFFICE VISIT (OUTPATIENT)
Dept: ORTHOPEDIC SURGERY | Facility: HOSPITAL | Age: 59
End: 2024-02-21
Payer: COMMERCIAL

## 2024-02-21 DIAGNOSIS — M16.11 PRIMARY LOCALIZED OSTEOARTHROSIS OF RIGHT HIP: Primary | ICD-10-CM

## 2024-02-21 DIAGNOSIS — R10.31 GROIN PAIN, RIGHT: ICD-10-CM

## 2024-02-21 PROCEDURE — 99213 OFFICE O/P EST LOW 20 MIN: CPT | Performed by: FAMILY MEDICINE

## 2024-02-21 PROCEDURE — 1036F TOBACCO NON-USER: CPT | Performed by: FAMILY MEDICINE

## 2024-02-21 ASSESSMENT — PAIN DESCRIPTION - DESCRIPTORS: DESCRIPTORS: ACHING

## 2024-02-21 ASSESSMENT — PAIN SCALES - GENERAL: PAINLEVEL_OUTOF10: 4

## 2024-02-21 ASSESSMENT — PAIN - FUNCTIONAL ASSESSMENT: PAIN_FUNCTIONAL_ASSESSMENT: 0-10

## 2024-02-21 NOTE — PROGRESS NOTES
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 Holt.    On 1/23/2024, he complains of chronic right hip pain for the past 7+ months.  He denies injury or trauma.  His symptoms began after working at home in the garage when he was moving lots of boxes and had pain the following day in the anterior hip and groin.  He is needed a crutch for assisted ambulation over the past 1 month.  He is self-employed doing remodeling projects.  He occasionally gets pain radiating down the anterior thigh but not into the lower leg.  His type 2 diabetes with oral medications.  He denies problems in the opposite hip.  He denies radicular numbness or tingling.  He has never had a cortisone shot before.  We agreed to a diagnostic and hopefully therapeutic cortisone injection into the right hip joint.  He tolerated this well. Activity modifications were reviewed.  He will keep any scheduled follow-up with Dr. Holt. I will see him back in 4 weeks or sooner as needed.  If he has no improvement, we may consider referral to Dr. Carroll for general surgery evaluation for hernia.    Today, 2/21/2024, he returns for 4-week follow-up of right hip and groin pain status post diagnostic cortisone injection into the right hip joint.  Overall he reports 50% improvement in his pain.  He is still having intermittent anterior groin pain.  He denies interval injury or trauma.    He has no other complaints.    Physical Examination:     The RIGHT hip and pelvis are without obvious signs of acute bony deformity, swelling, or instability.  There is no tenderness to the pubic symphysis or the proximal adductor tendons.  Active and passive range of motion are full, pain-free and symmetrical. Log roll is negative. Straight leg raise test is negative. Ashtyn is positive. Crossover  is negative. Hip strength is weak as compared to the opposite hip. The opposite hip is otherwise nontender and stable. Gait is antalgic and tandem.    Imaging:  === 01/03/24 ===  XR HIP RIGHT WITH PELVIS WHEN PERFORMED 2 OR 3 VIEWS  - Impression -  Mild degenerative changes in the right hip and lumbar spine.  Signed by: Trenton Brooks 1/5/2024 9:16 AM    Problem List Items Addressed This Visit             ICD-10-CM    Groin pain, right R10.31    Relevant Orders    Referral to General Surgery       Assessment and Plan:     We reviewed the exam and x-ray findings and discussed the conservative and surgical treatment options. We agreed that he got good relief with 50% improvement from the intra-articular cortisone injection.  50% of his pain is still coming from somewhere else in the area.  I am concerned that there may be some type of hernia and would like for him to see general surgeon, Dr. James Carroll for further evaluation.  I am happy to see him back in 2 to 3 months or later to repeat the cortisone injection.  Otherwise he will keep all follow-up with Dr. Holt.    **This note was dictated using Dragon speech recognition software and was not corrected for spelling or grammatical errors**.    Miki Shah MD  Sports Medicine Specialist  [facility] [place]

## 2024-03-28 ENCOUNTER — OFFICE VISIT (OUTPATIENT)
Dept: SURGERY | Facility: CLINIC | Age: 59
End: 2024-03-28
Payer: COMMERCIAL

## 2024-03-28 VITALS — TEMPERATURE: 97.3 F

## 2024-03-28 DIAGNOSIS — K40.90 INGUINAL HERNIA WITHOUT OBSTRUCTION OR GANGRENE, RECURRENCE NOT SPECIFIED, UNSPECIFIED LATERALITY: Primary | ICD-10-CM

## 2024-03-28 DIAGNOSIS — R10.31 GROIN PAIN, RIGHT: ICD-10-CM

## 2024-03-28 PROCEDURE — 1036F TOBACCO NON-USER: CPT | Performed by: SURGERY

## 2024-03-28 PROCEDURE — 99203 OFFICE O/P NEW LOW 30 MIN: CPT | Performed by: SURGERY

## 2024-03-28 RX ORDER — GLUCOSAMINE/CHONDRO SU A 500-400 MG
1 TABLET ORAL EVERY MORNING
COMMUNITY

## 2024-03-28 ASSESSMENT — PAIN SCALES - GENERAL: PAINLEVEL: 2

## 2024-03-28 NOTE — PROGRESS NOTES
History Of Present Illness  Delfino Lopez is a 59 y.o. male presenting right groin pain.  He has had a formal year.  Certain activities make it worse.  He did have an evaluation for hip problems.  He had seen Dr. Gillette and Dr. Shah.  Does have some mild arthritis of his right hip.  Some of the pain did go away with his injection but not all of it.  Patient has had no previous abdominal surgeries.  He is in otherwise good health.  He works in [employer].  Has had a colonoscopy years ago.  No constipation or diarrhea.        Last Recorded Vitals  Temperature 36.3 °C (97.3 °F).  Physical Examination  Awake and alert.  Normal respiration.  Abdomen is benign.  On examination he has an obvious right inguinal hernia.  It is reducible.  Perhaps some slight weakness on the left.  Slight skin changes in his right groin that he states was secondary to ringworm when he worked in [place] years ago    Assessment/Plan right inguinal hernia.  I reviewed the hernia booklets with him.  We discussed risk and benefits surgery.  Plan a laparoscopic right inguinal hernia pair at his convenience.    Teo Carroll MD FACS  Professor of Surgery  Georgina Dhaliwal Chair in Surgical [place]  [facility] [employer]  [id number] [place] [address], [address]  Phone [phone]  email: [email or web address]

## 2024-04-27 DIAGNOSIS — E11.9 TYPE 2 DIABETES MELLITUS WITHOUT COMPLICATION, WITHOUT LONG-TERM CURRENT USE OF INSULIN (MULTI): ICD-10-CM

## 2024-04-29 RX ORDER — METFORMIN HYDROCHLORIDE 500 MG/1
500 TABLET ORAL 2 TIMES DAILY
Qty: 180 TABLET | Refills: 3 | Status: SHIPPED | OUTPATIENT
Start: 2024-04-29

## 2024-05-04 DIAGNOSIS — E78.5 HYPERLIPIDEMIA, UNSPECIFIED: ICD-10-CM

## 2024-05-06 RX ORDER — ATORVASTATIN CALCIUM 10 MG/1
10 TABLET, FILM COATED ORAL DAILY
Qty: 90 TABLET | Refills: 3 | Status: SHIPPED | OUTPATIENT
Start: 2024-05-06

## 2024-05-10 ENCOUNTER — HOSPITAL ENCOUNTER (OUTPATIENT)
Facility: HOSPITAL | Age: 59
Setting detail: OUTPATIENT SURGERY
Discharge: HOME | End: 2024-05-10
Attending: SURGERY | Admitting: SURGERY
Payer: COMMERCIAL

## 2024-05-10 ENCOUNTER — ANESTHESIA EVENT (OUTPATIENT)
Dept: OPERATING ROOM | Facility: HOSPITAL | Age: 59
End: 2024-05-10
Payer: COMMERCIAL

## 2024-05-10 ENCOUNTER — ANESTHESIA (OUTPATIENT)
Dept: OPERATING ROOM | Facility: HOSPITAL | Age: 59
End: 2024-05-10
Payer: COMMERCIAL

## 2024-05-10 VITALS
RESPIRATION RATE: 19 BRPM | BODY MASS INDEX: 34.95 KG/M2 | TEMPERATURE: 97.7 F | WEIGHT: 222.66 LBS | DIASTOLIC BLOOD PRESSURE: 83 MMHG | OXYGEN SATURATION: 94 % | HEART RATE: 80 BPM | HEIGHT: 67 IN | SYSTOLIC BLOOD PRESSURE: 142 MMHG

## 2024-05-10 DIAGNOSIS — G89.18 ACUTE POSTOPERATIVE PAIN: ICD-10-CM

## 2024-05-10 DIAGNOSIS — K40.90 INGUINAL HERNIA WITHOUT OBSTRUCTION OR GANGRENE, RECURRENCE NOT SPECIFIED, UNSPECIFIED LATERALITY: Primary | ICD-10-CM

## 2024-05-10 LAB
GLUCOSE BLD MANUAL STRIP-MCNC: 106 MG/DL (ref 74–99)
GLUCOSE BLD MANUAL STRIP-MCNC: 139 MG/DL (ref 74–99)

## 2024-05-10 PROCEDURE — C1781 MESH (IMPLANTABLE): HCPCS | Performed by: SURGERY

## 2024-05-10 PROCEDURE — 2500000001 HC RX 250 WO HCPCS SELF ADMINISTERED DRUGS (ALT 637 FOR MEDICARE OP): Performed by: STUDENT IN AN ORGANIZED HEALTH CARE EDUCATION/TRAINING PROGRAM

## 2024-05-10 PROCEDURE — 3700000002 HC GENERAL ANESTHESIA TIME - EACH INCREMENTAL 1 MINUTE: Performed by: SURGERY

## 2024-05-10 PROCEDURE — 7100000009 HC PHASE TWO TIME - INITIAL BASE CHARGE: Performed by: SURGERY

## 2024-05-10 PROCEDURE — 2500000004 HC RX 250 GENERAL PHARMACY W/ HCPCS (ALT 636 FOR OP/ED)

## 2024-05-10 PROCEDURE — 2500000005 HC RX 250 GENERAL PHARMACY W/O HCPCS: Performed by: SURGERY

## 2024-05-10 PROCEDURE — 3600000008 HC OR TIME - EACH INCREMENTAL 1 MINUTE - PROCEDURE LEVEL THREE: Performed by: SURGERY

## 2024-05-10 PROCEDURE — 2500000005 HC RX 250 GENERAL PHARMACY W/O HCPCS

## 2024-05-10 PROCEDURE — 49650 LAP ING HERNIA REPAIR INIT: CPT | Performed by: SURGERY

## 2024-05-10 PROCEDURE — 2720000007 HC OR 272 NO HCPCS: Performed by: SURGERY

## 2024-05-10 PROCEDURE — 2780000003 HC OR 278 NO HCPCS: Performed by: SURGERY

## 2024-05-10 PROCEDURE — 3600000003 HC OR TIME - INITIAL BASE CHARGE - PROCEDURE LEVEL THREE: Performed by: SURGERY

## 2024-05-10 PROCEDURE — 7100000002 HC RECOVERY ROOM TIME - EACH INCREMENTAL 1 MINUTE: Performed by: SURGERY

## 2024-05-10 PROCEDURE — A49650 PR LAP,INGUINAL HERNIA REPR,INITIAL: Performed by: STUDENT IN AN ORGANIZED HEALTH CARE EDUCATION/TRAINING PROGRAM

## 2024-05-10 PROCEDURE — A49650 PR LAP,INGUINAL HERNIA REPR,INITIAL

## 2024-05-10 PROCEDURE — 7100000010 HC PHASE TWO TIME - EACH INCREMENTAL 1 MINUTE: Performed by: SURGERY

## 2024-05-10 PROCEDURE — 3700000001 HC GENERAL ANESTHESIA TIME - INITIAL BASE CHARGE: Performed by: SURGERY

## 2024-05-10 PROCEDURE — 7100000001 HC RECOVERY ROOM TIME - INITIAL BASE CHARGE: Performed by: SURGERY

## 2024-05-10 PROCEDURE — 82947 ASSAY GLUCOSE BLOOD QUANT: CPT

## 2024-05-10 DEVICE — PATCH, MESH, MARLEX, 6 X 6 IN, POLYPROPYLENE: Type: IMPLANTABLE DEVICE | Site: ABDOMEN | Status: FUNCTIONAL

## 2024-05-10 RX ORDER — OXYCODONE HYDROCHLORIDE 5 MG/1
5 TABLET ORAL EVERY 6 HOURS PRN
Qty: 12 TABLET | Refills: 0 | Status: SHIPPED | OUTPATIENT
Start: 2024-05-10 | End: 2024-05-17

## 2024-05-10 RX ORDER — SODIUM CHLORIDE, SODIUM LACTATE, POTASSIUM CHLORIDE, CALCIUM CHLORIDE 600; 310; 30; 20 MG/100ML; MG/100ML; MG/100ML; MG/100ML
100 INJECTION, SOLUTION INTRAVENOUS CONTINUOUS
Status: DISCONTINUED | OUTPATIENT
Start: 2024-05-10 | End: 2024-05-10 | Stop reason: HOSPADM

## 2024-05-10 RX ORDER — DIPHENHYDRAMINE HYDROCHLORIDE 50 MG/ML
12.5 INJECTION INTRAMUSCULAR; INTRAVENOUS ONCE AS NEEDED
Status: DISCONTINUED | OUTPATIENT
Start: 2024-05-10 | End: 2024-05-10 | Stop reason: HOSPADM

## 2024-05-10 RX ORDER — MIDAZOLAM HYDROCHLORIDE 1 MG/ML
INJECTION INTRAMUSCULAR; INTRAVENOUS AS NEEDED
Status: DISCONTINUED | OUTPATIENT
Start: 2024-05-10 | End: 2024-05-10

## 2024-05-10 RX ORDER — ROCURONIUM BROMIDE 10 MG/ML
INJECTION, SOLUTION INTRAVENOUS AS NEEDED
Status: DISCONTINUED | OUTPATIENT
Start: 2024-05-10 | End: 2024-05-10

## 2024-05-10 RX ORDER — FENTANYL CITRATE 50 UG/ML
INJECTION, SOLUTION INTRAMUSCULAR; INTRAVENOUS AS NEEDED
Status: DISCONTINUED | OUTPATIENT
Start: 2024-05-10 | End: 2024-05-10

## 2024-05-10 RX ORDER — ONDANSETRON HYDROCHLORIDE 2 MG/ML
4 INJECTION, SOLUTION INTRAVENOUS ONCE AS NEEDED
Status: DISCONTINUED | OUTPATIENT
Start: 2024-05-10 | End: 2024-05-10 | Stop reason: HOSPADM

## 2024-05-10 RX ORDER — CEFAZOLIN 1 G/1
INJECTION, POWDER, FOR SOLUTION INTRAVENOUS AS NEEDED
Status: DISCONTINUED | OUTPATIENT
Start: 2024-05-10 | End: 2024-05-10

## 2024-05-10 RX ORDER — OXYCODONE HYDROCHLORIDE 5 MG/1
5 TABLET ORAL EVERY 4 HOURS PRN
Status: DISCONTINUED | OUTPATIENT
Start: 2024-05-10 | End: 2024-05-10 | Stop reason: HOSPADM

## 2024-05-10 RX ORDER — LABETALOL HYDROCHLORIDE 5 MG/ML
5 INJECTION, SOLUTION INTRAVENOUS ONCE AS NEEDED
Status: DISCONTINUED | OUTPATIENT
Start: 2024-05-10 | End: 2024-05-10 | Stop reason: HOSPADM

## 2024-05-10 RX ORDER — LIDOCAINE HYDROCHLORIDE 20 MG/ML
INJECTION, SOLUTION EPIDURAL; INFILTRATION; INTRACAUDAL; PERINEURAL AS NEEDED
Status: DISCONTINUED | OUTPATIENT
Start: 2024-05-10 | End: 2024-05-10

## 2024-05-10 RX ORDER — KETOROLAC TROMETHAMINE 30 MG/ML
INJECTION, SOLUTION INTRAMUSCULAR; INTRAVENOUS AS NEEDED
Status: DISCONTINUED | OUTPATIENT
Start: 2024-05-10 | End: 2024-05-10

## 2024-05-10 RX ORDER — BUPIVACAINE HCL/EPINEPHRINE 0.5-1:200K
VIAL (ML) INJECTION AS NEEDED
Status: DISCONTINUED | OUTPATIENT
Start: 2024-05-10 | End: 2024-05-10 | Stop reason: HOSPADM

## 2024-05-10 RX ORDER — LIDOCAINE HYDROCHLORIDE 10 MG/ML
0.1 INJECTION, SOLUTION EPIDURAL; INFILTRATION; INTRACAUDAL; PERINEURAL ONCE
Status: DISCONTINUED | OUTPATIENT
Start: 2024-05-10 | End: 2024-05-10 | Stop reason: HOSPADM

## 2024-05-10 RX ORDER — ONDANSETRON HYDROCHLORIDE 2 MG/ML
INJECTION, SOLUTION INTRAVENOUS AS NEEDED
Status: DISCONTINUED | OUTPATIENT
Start: 2024-05-10 | End: 2024-05-10

## 2024-05-10 RX ORDER — PROPOFOL 10 MG/ML
INJECTION, EMULSION INTRAVENOUS AS NEEDED
Status: DISCONTINUED | OUTPATIENT
Start: 2024-05-10 | End: 2024-05-10

## 2024-05-10 RX ORDER — SODIUM CHLORIDE, SODIUM LACTATE, POTASSIUM CHLORIDE, CALCIUM CHLORIDE 600; 310; 30; 20 MG/100ML; MG/100ML; MG/100ML; MG/100ML
INJECTION, SOLUTION INTRAVENOUS CONTINUOUS PRN
Status: DISCONTINUED | OUTPATIENT
Start: 2024-05-10 | End: 2024-05-10

## 2024-05-10 RX ADMIN — SODIUM CHLORIDE, POTASSIUM CHLORIDE, SODIUM LACTATE AND CALCIUM CHLORIDE: 600; 310; 30; 20 INJECTION, SOLUTION INTRAVENOUS at 10:50

## 2024-05-10 RX ADMIN — SUGAMMADEX 200 MG: 100 INJECTION, SOLUTION INTRAVENOUS at 11:40

## 2024-05-10 RX ADMIN — OXYCODONE HYDROCHLORIDE 5 MG: 5 TABLET ORAL at 12:01

## 2024-05-10 RX ADMIN — LIDOCAINE HYDROCHLORIDE 100 MG: 20 INJECTION, SOLUTION EPIDURAL; INFILTRATION; INTRACAUDAL; PERINEURAL at 10:54

## 2024-05-10 RX ADMIN — MIDAZOLAM HYDROCHLORIDE 2 MG: 1 INJECTION, SOLUTION INTRAMUSCULAR; INTRAVENOUS at 10:50

## 2024-05-10 RX ADMIN — ONDANSETRON 4 MG: 2 INJECTION INTRAMUSCULAR; INTRAVENOUS at 11:02

## 2024-05-10 RX ADMIN — PROPOFOL 200 MG: 10 INJECTION, EMULSION INTRAVENOUS at 10:54

## 2024-05-10 RX ADMIN — FENTANYL CITRATE 50 MCG: 50 INJECTION, SOLUTION INTRAMUSCULAR; INTRAVENOUS at 10:59

## 2024-05-10 RX ADMIN — DEXAMETHASONE SODIUM PHOSPHATE 4 MG: 4 INJECTION, SOLUTION INTRAMUSCULAR; INTRAVENOUS at 11:02

## 2024-05-10 RX ADMIN — FENTANYL CITRATE 50 MCG: 50 INJECTION, SOLUTION INTRAMUSCULAR; INTRAVENOUS at 11:02

## 2024-05-10 RX ADMIN — CEFAZOLIN 2 G: 1 INJECTION, POWDER, FOR SOLUTION INTRAMUSCULAR; INTRAVENOUS at 10:59

## 2024-05-10 RX ADMIN — CARBOXYMETHYLCELLULOSE SODIUM 2 DROP: 0.5 SOLUTION/ DROPS OPHTHALMIC at 10:57

## 2024-05-10 RX ADMIN — KETOROLAC TROMETHAMINE 30 MG: 30 INJECTION, SOLUTION INTRAMUSCULAR at 11:34

## 2024-05-10 RX ADMIN — ROCURONIUM 50 MG: 100 INJECTION, SOLUTION INTRAVENOUS at 10:54

## 2024-05-10 ASSESSMENT — PAIN SCALES - GENERAL
PAINLEVEL_OUTOF10: 0 - NO PAIN
PAINLEVEL_OUTOF10: 3

## 2024-05-10 ASSESSMENT — COLUMBIA-SUICIDE SEVERITY RATING SCALE - C-SSRS
6. HAVE YOU EVER DONE ANYTHING, STARTED TO DO ANYTHING, OR PREPARED TO DO ANYTHING TO END YOUR LIFE?: NO
1. IN THE PAST MONTH, HAVE YOU WISHED YOU WERE DEAD OR WISHED YOU COULD GO TO SLEEP AND NOT WAKE UP?: NO
2. HAVE YOU ACTUALLY HAD ANY THOUGHTS OF KILLING YOURSELF?: NO

## 2024-05-10 ASSESSMENT — PAIN - FUNCTIONAL ASSESSMENT
PAIN_FUNCTIONAL_ASSESSMENT: 0-10

## 2024-05-10 ASSESSMENT — PAIN DESCRIPTION - LOCATION: LOCATION: ABDOMEN

## 2024-05-10 NOTE — OP NOTE
Laparoscopic Right Inguinal Hernia Repair (R) Operative Note     Date: 5/10/2024  OR Location: [facility] A OR    Name: Delfino Lopez, : 1965, Age: 59 y.o., MRN: [id number], Sex: male    Diagnosis  Pre-op Diagnosis     * Inguinal hernia without obstruction or gangrene, recurrence not specified, unspecified laterality [K40.90] Post-op Diagnosis     * Inguinal hernia without obstruction or gangrene, recurrence not specified, unspecified laterality [K40.90]     Procedures  Laparoscopic Right Inguinal Hernia Repair  [id number] - [place] LAPAROSCOPY SURG RPR INITIAL INGUINAL HERNIA      Surgeons      * [facility]    Resident/Fellow/Other Assistant:  Surgeons and Role:  * No surgeons found with a matching role *    Procedure Summary  Anesthesia: General  ASA: II  Anesthesia Staff: Anesthesiologist: Jordan Clemens MD  C-AA: PETERSON Jones  Estimated Blood Loss: 5mL  Intra-op Medications:   Administrations occurring from 1035 to 1150 on 05/10/24:   Medication Name Total Dose   BUPivacaine-EPINEPHrine (Marcaine w/EPI) 0.5 %-1:200,000 injection 14 mL              Anesthesia Record               Intraprocedure I/O Totals          Intake    LR infusion 700.00 mL    Total Intake 700 mL          Specimen: No specimens collected     Staff:   Circulator: Mike Santos RN  Relief Circulator: Lorie Bennett RN  Relief Scrub: Jocelin Guerra  Scrub Person: Jasvir Valerio         Drains and/or Catheters: * None in log *    Tourniquet Times:         Implants:  Implants       Type Name Action Serial No.      Surgical Mesh Sling Implant PATCH, MESH, MARLEX, 6 X 6 IN, POLYPROPYLENE - SN/A - [id number] Implanted N/A              Findings: Indirect inguinal hernia    Indications: Delfino Lopez is an 59 y.o. male who is having surgery for Inguinal hernia without obstruction or gangrene, recurrence not specified, unspecified laterality [K40.90].     The patient was seen in the preoperative area. The risks, benefits,  complications, treatment options, non-operative alternatives, expected recovery and outcomes were discussed with the patient. The possibilities of reaction to medication, pulmonary aspiration, injury to surrounding structures, bleeding, recurrent infection, the need for additional procedures, failure to diagnose a condition, and creating a complication requiring transfusion or operation were discussed with the patient. The patient concurred with the proposed plan, giving informed consent.  The site of surgery was properly noted/marked if necessary per policy. The patient has been actively warmed in preoperative area. Preoperative antibiotics have been ordered and given within 1 hours of incision. Venous thrombosis prophylaxis have been ordered including bilateral sequential compression devices    Procedure Details: The patient was brought to the op room.  General endotracheal anesthesia was performed.  Patient abdomen is prepped draped in usual fashion.  Made a vertical umbilical incision.  Opened up the anterior rectus fascia.  Retracted rectus muscle and placed my balloon dissector down the pubic tubercle.  I blew this up and direct visualization.  I remove this placed my structural balloon and insufflated properitoneal space pressure 15.  Placed 2  5mm trocars midline.  On the left-hand side there is no evidence of any hernia.  I turned my attention the right side.  Patient had a indirect inguinal hernia with a large cord lipoma.  This was reduced in its entirety.  I peritonealized the cord structures.  Cleaned off the direct space.  Opened up the lateral space.  I then placed a 3-1/2 x 6 cents piece of mesh to cover the entire floor of the inguinal canal.  Lied very nicely.  I fixated to Abdirizak's ligament with absorber tack.  Above the iliopubic tract I fixated it.  This is all done with absorbable tacker.  I then slowly desufflated properitoneal space.  The peritoneum came on top of the mesh very nicely.  I  removed my trocars.  Closed the fascial defect at the umbilicus with 0 Vicryl.  And skin incisions with 4-0 Vicryl.  Complications:  None; patient tolerated the procedure well.    Disposition: PACU - hemodynamically stable.  Condition: stable     Attending Attestation: I was present and scrubbed for the entire procedure.    Teo Carroll  Phone Number: [phone]

## 2024-05-10 NOTE — ANESTHESIA PROCEDURE NOTES
Airway  Date/Time: 5/10/2024 10:57 AM  Urgency: elective    Airway not difficult    Staffing  Performed: PETERSON   Authorized by: Jordan Clemens MD    Performed by: PETERSON Jones  Patient location during procedure: OR    Indications and Patient Condition  Indications for airway management: anesthesia  Spontaneous Ventilation: absent  Sedation level: deep  Preoxygenated: yes  Patient position: sniffing  Mask difficulty assessment: 3 - difficult mask (inadequate, unstable or two providers) +/- NMBA  No planned trial extubation    Final Airway Details  Final airway type: endotracheal airway      Successful airway: ETT  Cuffed: yes   Successful intubation technique: direct laryngoscopy  Facilitating devices/methods: intubating stylet and cricoid pressure  Endotracheal tube insertion site: oral  Blade: Sophy  Blade size: #4  ETT size (mm): 7.5  Cormack-Lehane Classification: grade I - full view of glottis  Placement verified by: chest auscultation and capnometry   Cuff volume (mL): 7  Measured from: lips  ETT to lips (cm): 22  Number of attempts at approach: 1    Additional Comments  Pt with significant beard and facial hair, able to BMV with 2 providers and OA. Easy intubation.

## 2024-05-10 NOTE — ANESTHESIA PREPROCEDURE EVALUATION
Patient: Delfino Lopez    Procedure Information       Date/Time: 05/10/24 1035    Procedure: Laparoscopic Right Inguinal Hernia Repair (Right)    Location: [facility] A OR 05 / [facility] OR    Surgeons: Teo Carroll MD            Relevant Problems   Cardiac   (+) Essential hypertension   (+) Hyperlipidemia      Endocrine   (+) Obesity      Musculoskeletal   (+) Primary localized osteoarthrosis of right hip      ID   (+) Yeast infection       Clinical information reviewed:   Tobacco  Allergies  Meds   Med Hx  Surg Hx   Fam Hx  Soc Hx        NPO Detail:  NPO/Void Status  Date of Last Liquid: 05/10/24  Time of Last Liquid: 0730  Date of Last Solid: 05/09/24  Time of Last Solid: 2200         Physical Exam    Airway  Mallampati: II  TM distance: >3 FB  Neck ROM: full     Cardiovascular   Rhythm: regular  Rate: normal     Dental    Pulmonary   Breath sounds clear to auscultation     Abdominal            Anesthesia Plan    History of general anesthesia?: yes  History of complications of general anesthesia?: no    ASA 2     general     intravenous induction   Anesthetic plan and risks discussed with patient.    Plan discussed with CRNA.

## 2024-05-10 NOTE — POST-PROCEDURE NOTE
1235 Family at bedside/ pt get dressed with family    1245 Pt void without issues    1250 Discharge instruction reviewed with pt and family by nurse/ all questions answered by nurse     1255IV removed

## 2024-05-10 NOTE — H&P
"History Of Present Illness  Delfino Lopez is a 59 y.o. male presenting with presenting right groin pain.  He has had a formal year.  Certain activities make it worse.  He did have an evaluation for hip problems.  He had seen Dr. Gillette and Dr. Shah.  Does have some mild arthritis of his right hip.  Some of the pain did go away with his injection but not all of it.  Patient has had no previous abdominal surgeries.  He is in otherwise good health.  He works in [employer].  Has had a colonoscopy years ago.  No constipation or diarrhea. .     Past Medical History  He has a past medical history of Encounter for general adult medical examination without abnormal findings (09/24/2020), Encounter for general adult medical examination without abnormal findings (10/11/2021), Encounter for screening for malignant neoplasm of prostate, Hyperlipidemia, Hypertension, and Type 2 diabetes mellitus (Multi).    Surgical History  He has no past surgical history on file.     Social History  He reports that he has never smoked. He has never used smokeless tobacco. He reports that he does not drink alcohol and does not use drugs.    Family History  Family History   Problem Relation Name Age of Onset    Kidney disease Mother      Hypertension Mother      Hyperthyroidism Mother      Diabetes Father      Other (CARDIAC DISORDER) Father          Allergies  Patient has no known allergies.    Review of Systems     Physical Exam   Awak and alert. Normal respiration. Abdomen bening  marked hernia  Last Recorded Vitals  Blood pressure 124/64, pulse 66, temperature 36.2 °C (97.2 °F), temperature source Temporal, resp. rate 18, height 1.702 m (5' 7\"), weight 101 kg (222 lb 10.6 oz), SpO2 97%.    RAssessment/Plan   Principal Problem:    Inguinal hernia without obstruction or gangrene        Teo Carroll MD    "

## 2024-05-10 NOTE — ANESTHESIA POSTPROCEDURE EVALUATION
Patient: Delfino Lopez    Procedure Summary       Date: 05/10/24 Room / Location: [facility] OR [facility] OR    Anesthesia Start: 1050 Anesthesia Stop: 1149    Procedure: Laparoscopic Right Inguinal Hernia Repair (Right) Diagnosis:       Inguinal hernia without obstruction or gangrene, recurrence not specified, unspecified laterality      (Inguinal hernia without obstruction or gangrene, recurrence not specified, unspecified laterality [K40.90])    Surgeons: Teo Carroll MD Responsible Provider: Jordan Clemens MD    Anesthesia Type: general ASA Status: 2            Anesthesia Type: general    Vitals Value Taken Time   /77 05/10/24 1217   Temp 36.3 °C (97.3 °F) 05/10/24 1147   Pulse 74 05/10/24 1218   Resp 20 05/10/24 1215   SpO2 91 % 05/10/24 1218   Vitals shown include unfiled device data.    Anesthesia Post Evaluation    Patient location during evaluation: bedside  Patient participation: complete - patient participated  Level of consciousness: awake  Pain management: adequate  Multimodal analgesia pain management approach  Airway patency: patent  Cardiovascular status: stable  Respiratory status: spontaneous ventilation and unassisted  Hydration status: acceptable  Postoperative Nausea and Vomiting: none  Comments: No significant PONV.        No notable events documented.

## 2024-05-13 ASSESSMENT — PAIN SCALES - GENERAL: PAINLEVEL_OUTOF10: 0 - NO PAIN

## 2024-05-28 ENCOUNTER — OFFICE VISIT (OUTPATIENT)
Dept: SURGERY | Facility: CLINIC | Age: 59
End: 2024-05-28
Payer: COMMERCIAL

## 2024-05-28 VITALS
HEART RATE: 76 BPM | SYSTOLIC BLOOD PRESSURE: 119 MMHG | BODY MASS INDEX: 35.62 KG/M2 | WEIGHT: 227.4 LBS | DIASTOLIC BLOOD PRESSURE: 76 MMHG | TEMPERATURE: 98 F

## 2024-05-28 DIAGNOSIS — K40.90 INGUINAL HERNIA WITHOUT OBSTRUCTION OR GANGRENE, RECURRENCE NOT SPECIFIED, UNSPECIFIED LATERALITY: Primary | ICD-10-CM

## 2024-05-28 PROCEDURE — 99024 POSTOP FOLLOW-UP VISIT: CPT | Performed by: SURGERY

## 2024-05-28 PROCEDURE — 3078F DIAST BP <80 MM HG: CPT | Performed by: SURGERY

## 2024-05-28 PROCEDURE — 3074F SYST BP LT 130 MM HG: CPT | Performed by: SURGERY

## 2024-05-28 PROCEDURE — 1036F TOBACCO NON-USER: CPT | Performed by: SURGERY

## 2024-05-28 ASSESSMENT — PAIN SCALES - GENERAL: PAINLEVEL: 2

## 2024-05-28 NOTE — PROGRESS NOTES
History Of Present Illness  Delfino Lopez is a 59 y.o. male presenting status post laparoscopic right inguinal hernia.  He is doing well.  Again he has had no long history of right groin hip pain.  This true inguinal hernia is part of his pain constellation.  He feels somewhat better.  He only took 1 pain pill after the operation      Last Recorded Vitals  Blood pressure 119/76, pulse 76, temperature 36.7 °C (98 °F), weight 103 kg (227 lb 6.4 oz).  Physical Exam abdominal incisions well-healed.  No swelling or other problems.      Assessment/Plan   He is status post laparoscopic right inguinal hernia.  He can start resuming his activities.  He had been given a prescription for physical therapy in the past.  He can start that again now.  He will follow-up me as needed.    Teo Carroll MD FACS  Professor of Surgery  Georgina Dhaliwal Chair in Surgical [place]  [facility] [employer] [facility]  [id number] [place] [address], [address]  Phone [phone]  email: [email or web address]

## 2024-06-11 DIAGNOSIS — I10 ESSENTIAL HYPERTENSION: ICD-10-CM

## 2024-06-12 RX ORDER — LISINOPRIL AND HYDROCHLOROTHIAZIDE 10; 12.5 MG/1; MG/1
1.5 TABLET ORAL DAILY
Qty: 135 TABLET | Refills: 0 | Status: SHIPPED | OUTPATIENT
Start: 2024-06-12

## 2024-06-25 ENCOUNTER — APPOINTMENT (OUTPATIENT)
Dept: PRIMARY CARE | Facility: CLINIC | Age: 59
End: 2024-06-25
Payer: COMMERCIAL

## 2024-06-27 ENCOUNTER — APPOINTMENT (OUTPATIENT)
Dept: PRIMARY CARE | Facility: CLINIC | Age: 59
End: 2024-06-27
Payer: COMMERCIAL

## 2024-07-02 ENCOUNTER — APPOINTMENT (OUTPATIENT)
Dept: PRIMARY CARE | Facility: CLINIC | Age: 59
End: 2024-07-02
Payer: COMMERCIAL

## 2024-07-02 DIAGNOSIS — E11.69 TYPE 2 DIABETES MELLITUS WITH OTHER SPECIFIED COMPLICATION, UNSPECIFIED WHETHER LONG TERM INSULIN USE (MULTI): ICD-10-CM

## 2024-07-02 DIAGNOSIS — D50.9 IRON DEFICIENCY ANEMIA, UNSPECIFIED IRON DEFICIENCY ANEMIA TYPE: ICD-10-CM

## 2024-07-02 DIAGNOSIS — E78.5 HYPERLIPIDEMIA, UNSPECIFIED HYPERLIPIDEMIA TYPE: ICD-10-CM

## 2024-07-02 DIAGNOSIS — M10.9 GOUT, UNSPECIFIED CAUSE, UNSPECIFIED CHRONICITY, UNSPECIFIED SITE: ICD-10-CM

## 2024-07-02 DIAGNOSIS — E78.00 ELEVATED LDL CHOLESTEROL LEVEL: ICD-10-CM

## 2024-07-02 DIAGNOSIS — I10 PRIMARY HYPERTENSION: ICD-10-CM

## 2024-07-02 DIAGNOSIS — E03.9 HYPOTHYROIDISM, UNSPECIFIED TYPE: ICD-10-CM

## 2024-07-02 DIAGNOSIS — I10 ESSENTIAL HYPERTENSION: ICD-10-CM

## 2024-07-02 DIAGNOSIS — I10 BENIGN ESSENTIAL HYPERTENSION: ICD-10-CM

## 2024-07-02 LAB
ALBUMIN SERPL BCP-MCNC: 4.2 G/DL (ref 3.4–5)
ALP SERPL-CCNC: 53 U/L (ref 33–136)
ALT SERPL W P-5'-P-CCNC: 39 U/L (ref 10–52)
ANION GAP SERPL CALC-SCNC: 17 MMOL/L (ref 10–20)
AST SERPL W P-5'-P-CCNC: 29 U/L (ref 9–39)
BILIRUB SERPL-MCNC: 0.3 MG/DL (ref 0–1.2)
BUN SERPL-MCNC: 17 MG/DL (ref 6–23)
CALCIUM SERPL-MCNC: 9 MG/DL (ref 8.6–10.6)
CHLORIDE SERPL-SCNC: 103 MMOL/L (ref 98–107)
CHOLEST SERPL-MCNC: 135 MG/DL (ref 0–199)
CHOLESTEROL/HDL RATIO: 3
CO2 SERPL-SCNC: 25 MMOL/L (ref 21–32)
CREAT SERPL-MCNC: 1.14 MG/DL (ref 0.5–1.3)
EGFRCR SERPLBLD CKD-EPI 2021: 73 ML/MIN/1.73M*2
GLUCOSE SERPL-MCNC: 98 MG/DL (ref 74–99)
HDLC SERPL-MCNC: 45.4 MG/DL
LDLC SERPL CALC-MCNC: 52 MG/DL
NON HDL CHOLESTEROL: 90 MG/DL (ref 0–149)
POTASSIUM SERPL-SCNC: 4.1 MMOL/L (ref 3.5–5.3)
PROT SERPL-MCNC: 6.5 G/DL (ref 6.4–8.2)
SODIUM SERPL-SCNC: 141 MMOL/L (ref 136–145)
TRIGL SERPL-MCNC: 187 MG/DL (ref 0–149)
URATE SERPL-MCNC: 6.2 MG/DL (ref 4–7.5)
VLDL: 37 MG/DL (ref 0–40)

## 2024-07-02 PROCEDURE — 83036 HEMOGLOBIN GLYCOSYLATED A1C: CPT

## 2024-07-02 PROCEDURE — 36415 COLL VENOUS BLD VENIPUNCTURE: CPT

## 2024-07-02 PROCEDURE — 80061 LIPID PANEL: CPT

## 2024-07-02 PROCEDURE — 80053 COMPREHEN METABOLIC PANEL: CPT

## 2024-07-02 PROCEDURE — 84550 ASSAY OF BLOOD/URIC ACID: CPT

## 2024-07-03 LAB
EST. AVERAGE GLUCOSE BLD GHB EST-MCNC: 120 MG/DL
HBA1C MFR BLD: 5.8 %

## 2024-07-05 ENCOUNTER — APPOINTMENT (OUTPATIENT)
Dept: PRIMARY CARE | Facility: CLINIC | Age: 59
End: 2024-07-05
Payer: COMMERCIAL

## 2024-07-05 VITALS
BODY MASS INDEX: 36.02 KG/M2 | OXYGEN SATURATION: 94 % | RESPIRATION RATE: 14 BRPM | WEIGHT: 230 LBS | HEART RATE: 64 BPM | DIASTOLIC BLOOD PRESSURE: 68 MMHG | SYSTOLIC BLOOD PRESSURE: 106 MMHG

## 2024-07-05 DIAGNOSIS — M25.551 RIGHT HIP PAIN: ICD-10-CM

## 2024-07-05 DIAGNOSIS — I10 ESSENTIAL HYPERTENSION: Primary | ICD-10-CM

## 2024-07-05 DIAGNOSIS — Z12.11 SCREEN FOR COLON CANCER: ICD-10-CM

## 2024-07-05 DIAGNOSIS — M16.11 OSTEOARTHRITIS OF RIGHT HIP, UNSPECIFIED OSTEOARTHRITIS TYPE: Primary | ICD-10-CM

## 2024-07-05 DIAGNOSIS — E78.5 HYPERLIPIDEMIA, UNSPECIFIED HYPERLIPIDEMIA TYPE: ICD-10-CM

## 2024-07-05 DIAGNOSIS — E11.9 TYPE 2 DIABETES MELLITUS WITHOUT COMPLICATION, WITHOUT LONG-TERM CURRENT USE OF INSULIN (MULTI): ICD-10-CM

## 2024-07-05 DIAGNOSIS — M10.9 GOUT, UNSPECIFIED: ICD-10-CM

## 2024-07-05 LAB — POC FINGERSTICK BLOOD GLUCOSE: 129 MG/DL (ref 70–100)

## 2024-07-05 PROCEDURE — 3078F DIAST BP <80 MM HG: CPT | Performed by: INTERNAL MEDICINE

## 2024-07-05 PROCEDURE — 3044F HG A1C LEVEL LT 7.0%: CPT | Performed by: INTERNAL MEDICINE

## 2024-07-05 PROCEDURE — 99214 OFFICE O/P EST MOD 30 MIN: CPT | Performed by: INTERNAL MEDICINE

## 2024-07-05 PROCEDURE — 3074F SYST BP LT 130 MM HG: CPT | Performed by: INTERNAL MEDICINE

## 2024-07-05 PROCEDURE — 3048F LDL-C <100 MG/DL: CPT | Performed by: INTERNAL MEDICINE

## 2024-07-05 PROCEDURE — 82962 GLUCOSE BLOOD TEST: CPT | Performed by: INTERNAL MEDICINE

## 2024-07-05 RX ORDER — ALLOPURINOL 300 MG/1
300 TABLET ORAL DAILY
Qty: 90 TABLET | Refills: 3 | Status: SHIPPED | OUTPATIENT
Start: 2024-07-05

## 2024-07-05 ASSESSMENT — ENCOUNTER SYMPTOMS
LOSS OF SENSATION IN FEET: 0
DEPRESSION: 0
OCCASIONAL FEELINGS OF UNSTEADINESS: 0

## 2024-07-05 ASSESSMENT — PAIN SCALES - GENERAL: PAINLEVEL: 2

## 2024-07-07 PROBLEM — M25.551 RIGHT HIP PAIN: Status: ACTIVE | Noted: 2024-07-07

## 2024-07-07 ASSESSMENT — ENCOUNTER SYMPTOMS: HIP PAIN: 1

## 2024-07-07 NOTE — PROGRESS NOTES
Subjective   Chief complaint: Delfino Lopez is a 59 y.o. male who presents for Follow-up (Pt is being seen for followup visit post hernia repair surgery 05-) and Hip Pain (Pt c/o hip x 1 year with pain level of 2/10).    HPI:  Follow-up general medical care colonoscopy    Hip Pain         Objective   /68   Pulse 64   Resp 14   Wt 104 kg (230 lb)   SpO2 94%   BMI 36.02 kg/m²   Physical Exam  Vitals reviewed.   Constitutional:       Appearance: Normal appearance.   HENT:      Head: Normocephalic and atraumatic.   Cardiovascular:      Rate and Rhythm: Normal rate and regular rhythm.   Pulmonary:      Effort: Pulmonary effort is normal.      Breath sounds: Normal breath sounds.   Abdominal:      General: Bowel sounds are normal.      Palpations: Abdomen is soft.   Musculoskeletal:      Cervical back: Neck supple.   Skin:     General: Skin is warm and dry.   Neurological:      General: No focal deficit present.      Mental Status: He is alert.   Psychiatric:         Mood and Affect: Mood normal.         Behavior: Behavior is cooperative.         I have reviewed and reconciled the medication list with the patient today.   Current Outpatient Medications:     atorvastatin (Lipitor) 10 mg tablet, TAKE 1 TABLET BY MOUTH EVERY DAY, Disp: 90 tablet, Rfl: 3    flaxseed oiL 1,000 mg capsule, Take 1 capsule (1,000 mg) by mouth 3 times a day., Disp: , Rfl:     glucosamine-chondroitin 500-400 mg tablet, Take 1 tablet by mouth once daily in the morning., Disp: , Rfl:     lisinopriL-hydrochlorothiazide 10-12.5 mg tablet, Take 1.5 tablets by mouth once daily. Take 1.5 tablet daily, Disp: 135 tablet, Rfl: 0    metFORMIN (Glucophage) 500 mg tablet, Take 1 tablet (500 mg) by mouth in the morning and 1 tablet (500 mg) before bedtime., Disp: 180 tablet, Rfl: 3    multivitamin/iron/folic acid (CENTRUM ORAL), Take by mouth., Disp: , Rfl:     omega 3-dha-epa-fish oil 360 mg-108 mg- 180 mg-1,200 mg capsule, Take 1 capsule by  mouth 3 times daily (morning, midday, late afternoon)., Disp: , Rfl:     turmeric (CURCUMIN MISC), Take by mouth. TUMERIC CURCUMIN ORAL CAPSULE, Disp: , Rfl:     allopurinol (Zyloprim) 300 mg tablet, Take 1 tablet (300 mg) by mouth once daily., Disp: 90 tablet, Rfl: 3     Imaging:  No results found.     Labs reviewed:    Lab Results   Component Value Date    WBC 7.6 11/09/2023    HGB 14.7 11/09/2023    HCT 46.4 11/09/2023     11/09/2023    CHOL 135 07/02/2024    TRIG 187 (H) 07/02/2024    HDL 45.4 07/02/2024    ALT 39 07/02/2024    AST 29 07/02/2024     07/02/2024    K 4.1 07/02/2024     07/02/2024    CREATININE 1.14 07/02/2024    BUN 17 07/02/2024    CO2 25 07/02/2024    TSH 2.56 11/09/2023    PSA 0.74 09/24/2020    HGBA1C 5.8 (H) 07/02/2024       Assessment/Plan   Problem List Items Addressed This Visit       Diabetes mellitus (Multi)     Continue metformin  Low-carb diet         Relevant Orders    POCT Fingerstick Glucose manually resulted (Completed)    Essential hypertension - Primary     Continue lisinopril.         Hyperlipidemia     Statin and fish oil  Low-fat diet         Screen for colon cancer     Colonoscopy         Relevant Orders    Colonoscopy Screening; Average Risk Patient    Right hip pain     Referred for physical therapy consider steroid injection            Continue current medications as listed  Follow up in follow-up in November for complete physical

## 2024-09-09 DIAGNOSIS — I10 ESSENTIAL HYPERTENSION: ICD-10-CM

## 2024-09-09 RX ORDER — LISINOPRIL AND HYDROCHLOROTHIAZIDE 10; 12.5 MG/1; MG/1
1.5 TABLET ORAL DAILY
Qty: 135 TABLET | Refills: 0 | Status: SHIPPED | OUTPATIENT
Start: 2024-09-09

## 2024-09-09 NOTE — PROGRESS NOTES
Physical Therapy  Physical Therapy Orthopedic Evaluation    Patient Name: Delfino Lopez  MRN: [id number]  Today's Date: 9/10/2024  Time Calculation  Start Time: 1045  Stop Time: 1130  Time Calculation (min): 45 min  Visit # 1    Reason for visit: Right hip pain   Referring MD: Liu Maxwell  Insurance: AUTH NEEDED / NO OOP / 30 VISITS / [employer]   DX:   Problem List Items Addressed This Visit             ICD-10-CM    Chronic right hip pain - Primary M25.551, G89.29    Relevant Orders    Follow Up In Physical Therapy    Osteoarthritis of right hip M16.11    Relevant Orders    Follow Up In Physical Therapy          Current Problem  1. Chronic right hip pain  Follow Up In Physical Therapy      2. Osteoarthritis of right hip, unspecified osteoarthritis type  Referral to Physical Therapy    Follow Up In Physical Therapy          General:  General  Reason for Referral: Right hip pain  Referred By: Liu Maxwell  Precautions:  Precautions  STEADI Fall Risk Score (The score of 4 or more indicates an increased risk of falling): 3  Precautions Comment: none  Pain:  Pain Assessment: 0-10  0-10 (Numeric) Pain Score: 2    Subjective:     Subjective   Chief Complaint: I was working lifting some boxes overhead and to a bench.  Somewhere that evening I started to have pain in my groin on the right side.  At some point it started to get worse.  I saw my MD and he sent me to a hip specialist.  He said I have some arthritis in my hip.  He gave me a therapy referral but I did not do it right away. Also sent me to get an injection, he mentioned about the hernia.  I was getting gladys pains when I would lift my leg in a sitting position.   S/p post inguinal hernia repair surgery (05-), has had long history of right sided hip pain as well as history of  low back pain.  My hip is much better since the hernia repair but still something going on.      Onset: about a year ago       Current Condition: improving    PAIN  Intensity (0-10):  1-2/10,   Location: right hip/groin  Description: dull achy     Aggravating Factors:  lifting things, stairs, squatting   Relieving Factors:  rest , recliner     Relevant Information (PMH & Previous Tests/Imaging):   PMH: post hernia repair surgery (05-) , diabetes, HTN, HLD,     Xray right hip/pelvis   FINDINGS:   No fracture, dislocation or lytic or blastic bone lesion is seen.   Minimal-mild joint space narrowing and superolateral acetabular bony   hypertrophy are present at the right hip, without evidence of   avascular necrosis. The left hip and SI joints appear normal on the   pelvic image, which shows at least mild multilevel lumbar spine   degenerative changes.      Mild degenerative changes in the right hip and lumbar spine.     Previous Interventions/Treatments: hip injection     Prior Level of Function (PLOF)  Exercise/Physical Activity: no  Work/School:Part Time  remodeling , self employed   Living situation/Environment: lives alone , no problems reported     Treatment Goals: less pain, figure out hip problem, be sure to do things like lifting and go on a ladder    Red Flags: Do you have any of the following? No   Fever/chills, unexplained weight changes, dizziness/fainting, unexplained change in bowel or bladder functions, unexplained malaise or muscle weakness, night pain/sweats, numbness or tingling  Medical history reviewed:  yes (scanned in chart)    Objective:  Objective     Observation/Posture: level sacral base   Gait: antalgic limp right LE     Transfers:  sit <> stand: independent favors right   sup <> sit: min difficulty  bed mobility: min difficulty      Lumbar ROM  Flex=WNL  Ext=MOD  Side bend: L=MOD [] R=MOD  Rotation:    L=WNL [] R=WNL    Special tests:  SLR = negative   EUFEMIA = positive minimally   FADIR = negative   Piriformis = positive minimally     Flexibility  Hamstrings: WNL  Piriformis: MOD  Quads: MIN  Hip flexors: MIN    Strength  Abdominals= Poor  Bridge = Good      Hip  ROM  Flex: L= 109 [] R=  100   Abd L= 30  [] R= 35   Ext L= 7  [] R= 4   IR L= WFL  [] R= WFL   ER L= 40  [] R= 25     Hip Strength MMT  Flex: L= 5/5 [] R= 4/5   Abd L= 4+/5 [] R= 4/5  Add L= 5/5 [] R= 4/5  Ext L= 4/5 [] R= 4/5          Outcome Measures:  Other Measures  Lower Extremity Funtional Score (LEFS): 41/80     EDUCATION: home exercise program, plan of care, activity modifications, pain management, and injury pathology       Goals:  Active       PT Problem       PT Goal 1       Start:  09/10/24    Expected End:  10/08/24       Dec pain 50% to improve mobility and function   Patient will demonstrate good understanding and compliance with HEP          PT Goal 2       Start:  09/10/24    Expected End:  11/05/24       Increase LEFS score by 9 points   Right hip ROM = left   Right hip strength 4+/5  SLS 20 sec to improve balance and stability   Lat dip w/o valgus collapse   Decrease pain to  0-1/10   Will ascend/descend stairs with normalized pattern               Treatments:   Plan: Access Code: [email or web address]  URL: [email or web address]  Date: 09/10/2024  Prepared by: Irvin Palacios    Exercises  - Hooklying Single Knee to Chest  - 2-3 x daily - 3-4 reps - 20-30 hold  - Supine Figure 4 Piriformis Stretch  - 2-3 x daily - 3-4 reps - 30 hold  - Supine Piriformis Stretch with Foot on Ground  - 2-3 x daily - 3-4 reps - 30 hold  - Modified Ian Stretch  - 2-3 x daily - 1 sets - 3-4 reps - 30 hold  - Small Range Straight Leg Raise  - 1-2 x daily - 2-3 sets - 10 reps  - Sidelying Hip Abduction  - 1-2 x daily - 2-3 sets - 10 reps  - Sidelying Hip Adduction  - 1-2 x daily - 2-3 sets - 10 reps  - Prone Hip Extension  - 1-2 x daily - 2-3 sets - 10 reps       Assessment: Patient presents with signs and symptoms consistent with right hip oa/pathology , resulting in limited participation in pain-free ADLs and inability to perform at their prior level of function. Pt would benefit from physical therapy to  address the impairments found & listed previously in the objective section in order to return to safe and pain-free ADLs and prior level of function.     Pain, Impaired gait, Impaired stair negotiation , Decreased flexibility, Decreased strength, Limitations to normal ADL's, and Decreased knowledge of HEP     Planned Interventions include: therapeutic exercise, self-care home management, manual therapy, therapeutic activities, gait training, neuromuscular coordination, aquatic therapy, modalities PRN  Frequency: 1-2 /week   Duration: 6-8 weeks    Irvin Palacios, PT

## 2024-09-10 ENCOUNTER — EVALUATION (OUTPATIENT)
Dept: PHYSICAL THERAPY | Facility: CLINIC | Age: 59
End: 2024-09-10
Payer: COMMERCIAL

## 2024-09-10 DIAGNOSIS — M25.551 CHRONIC RIGHT HIP PAIN: Primary | ICD-10-CM

## 2024-09-10 DIAGNOSIS — G89.29 CHRONIC RIGHT HIP PAIN: Primary | ICD-10-CM

## 2024-09-10 DIAGNOSIS — M16.11 OSTEOARTHRITIS OF RIGHT HIP, UNSPECIFIED OSTEOARTHRITIS TYPE: ICD-10-CM

## 2024-09-10 PROCEDURE — 97110 THERAPEUTIC EXERCISES: CPT | Mod: GP | Performed by: PHYSICAL THERAPIST

## 2024-09-10 PROCEDURE — 97161 PT EVAL LOW COMPLEX 20 MIN: CPT | Mod: GP | Performed by: PHYSICAL THERAPIST

## 2024-09-10 ASSESSMENT — PAIN - FUNCTIONAL ASSESSMENT: PAIN_FUNCTIONAL_ASSESSMENT: 0-10

## 2024-09-10 ASSESSMENT — PAIN SCALES - GENERAL: PAINLEVEL_OUTOF10: 2

## 2024-09-17 ENCOUNTER — TREATMENT (OUTPATIENT)
Dept: PHYSICAL THERAPY | Facility: CLINIC | Age: 59
End: 2024-09-17
Payer: COMMERCIAL

## 2024-09-17 DIAGNOSIS — M25.551 CHRONIC RIGHT HIP PAIN: ICD-10-CM

## 2024-09-17 DIAGNOSIS — G89.29 CHRONIC RIGHT HIP PAIN: ICD-10-CM

## 2024-09-17 DIAGNOSIS — M16.11 OSTEOARTHRITIS OF RIGHT HIP, UNSPECIFIED OSTEOARTHRITIS TYPE: ICD-10-CM

## 2024-09-17 PROCEDURE — 97110 THERAPEUTIC EXERCISES: CPT | Mod: GP | Performed by: PHYSICAL THERAPIST

## 2024-09-17 PROCEDURE — 97112 NEUROMUSCULAR REEDUCATION: CPT | Mod: GP | Performed by: PHYSICAL THERAPIST

## 2024-09-17 NOTE — PROGRESS NOTES
"Physical Therapy  Physical Therapy Treatment    Patient Name: Delfino Lopez  MRN: [id number]  Today's Date: 9/17/2024  Time Calculation  Start Time: 1330  Stop Time: 1415  Time Calculation (min): 45 min    Visit # 2   Reason for visit: Right hip pain   Referring MD: Liu Maxwell  Insurance: AUTH NEEDED / NO OOP / 30 VISITS / [employer]   DX: Chronic right hip pain, OA right hip,   POC: 1-2/week 6-8 weeks     Current Problem  1. Osteoarthritis of right hip, unspecified osteoarthritis type  Follow Up In Physical Therapy      2. Chronic right hip pain  Follow Up In Physical Therapy          General  Reason for Referral: Right hip pain  Referred By: Liu Maxwell  Precautions  Precautions  STEADI Fall Risk Score (The score of 4 or more indicates an increased risk of falling): 3  Precautions Comment: none  Pain       Subjective:   Subjective   Patient reports 0-1/10 pain currently in right hip.  It took me a couple of days to get started my home program.  Laying flat on the floor seems to get my back to cramp on the left side.    HEP compliance - partially     Objective:   Objective   Gait- Right lateral trunk lean in stance     Treatments:  Stepper L3   SKTC 20\" x3 elly   Fig 4 1' elly   Piriformis stretch 1' elly   Ian stretch 1' elly   SLR 4 way 2x10   Bridge x10   LTR x20   DBE 2' x2 way   Tandem on airex 1' elly     Plan: Access Code: [email or web address]     Charges 2 TE 1 NME     - PT Therapeutic Procedures Time Entry  Neuromuscular Re-Education Time Entry: 10  Therapeutic Exercise Time Entry: 35 -       Assessment:    Better tolerance to hip adduction using chair.    Plan:    Continue per POC to improve ROM, flexibility and strength to decrease pain and allow return to prior level of function.        Irvin Palacios, PT  "

## 2024-10-10 ENCOUNTER — TREATMENT (OUTPATIENT)
Dept: PHYSICAL THERAPY | Facility: CLINIC | Age: 59
End: 2024-10-10
Payer: COMMERCIAL

## 2024-10-10 DIAGNOSIS — M16.11 OSTEOARTHRITIS OF RIGHT HIP, UNSPECIFIED OSTEOARTHRITIS TYPE: ICD-10-CM

## 2024-10-10 DIAGNOSIS — M25.551 CHRONIC RIGHT HIP PAIN: ICD-10-CM

## 2024-10-10 DIAGNOSIS — G89.29 CHRONIC RIGHT HIP PAIN: ICD-10-CM

## 2024-10-10 PROCEDURE — 97110 THERAPEUTIC EXERCISES: CPT | Mod: GP | Performed by: PHYSICAL THERAPIST

## 2024-10-10 PROCEDURE — 97112 NEUROMUSCULAR REEDUCATION: CPT | Mod: GP | Performed by: PHYSICAL THERAPIST

## 2024-10-10 ASSESSMENT — PAIN - FUNCTIONAL ASSESSMENT: PAIN_FUNCTIONAL_ASSESSMENT: 0-10

## 2024-10-10 ASSESSMENT — PAIN SCALES - GENERAL: PAINLEVEL_OUTOF10: 1

## 2024-10-10 NOTE — PROGRESS NOTES
"Physical Therapy  Physical Therapy Treatment    Patient Name: Delfino Lopez  MRN: [id number]  Today's Date: 10/10/2024  Time Calculation  Start Time: 0752  Stop Time: 0840  Time Calculation (min): 48 min    Visit # 3   Reason for visit: Right hip pain   Referring MD: Liu Maxwell  Insurance: AUTH NEEDED / NO OOP / 30 VISITS / [employer]   DX: Chronic right hip pain, OA right hip,   POC: 1-2/week 6-8 weeks     Current Problem  1. Osteoarthritis of right hip, unspecified osteoarthritis type  Follow Up In Physical Therapy      2. Chronic right hip pain  Follow Up In Physical Therapy          General  Reason for Referral: Right hip pain  Referred By: Liu Maxwell  Precautions  Precautions  STEADI Fall Risk Score (The score of 4 or more indicates an increased risk of falling): 3  Precautions Comment: none  Pain  Pain Assessment: 0-10  0-10 (Numeric) Pain Score: 1    Subjective:   Subjective   Patient reports 1-2/10 pain currently at the most.  I feel improvement and more muscle tone.   The joint is up and down.     HEP compliance - partially , I average once a day     Objective:   Objective   Gait- Right lateral trunk lean in stance     Hip Strength MMT  Flex:L= 5/5 [] R= 5-/5   AbdL= 5/5 [] R= 4+/5  AddL= 5/5 [] R= 4+/5  ExtL= 4/5 [] R= 4/5      Below from eval-----------------------------------  Lumbar ROM  Flex=WNL  Ext=MOD  Side bend: L=MOD [] R=MOD  Rotation:    L=WNL [] R=WNL     Flexibility  Hamstrings: WNL  Piriformis: MOD  Quads: MIN  Hip flexors: MIN     Strength  Abdominals= Poor  Bridge = Good      Hip ROM  Flex:L= 109 [] R=  100   AbdL= 30  [] R= 35   ExtL= 7  [] R= 4   IRL= WFL  [] R= WFL   ERL= 40  [] R= 25      Hip Strength MMT  Flex:L= 5/5 [] R= 4/5   AbdL= 4+/5 [] R= 4/5  AddL= 5/5 [] R= 4/5  ExtL= 4/5 [] R= 4/5    Treatments:  Stepper L3 x5'   DBE 2' x2 way   Tandem on airex 1' elly   SKTC 20\" x3 elly   CONRAD for hip flexor stretch 1'   SLR 4 way 2x10   Bridge 2x12   LTR x20   TA with march 2x20   Cybex hip " abd/add 32.5# 2x15   Multi hip ext 55# 2x15 richard   Fig 4 1' richard   Piriformis stretch 1' richard   Ian stretch 1' richard       Plan: Access Code: [email or web address]     Charges 2 TE 1 NME     - PT Therapeutic Procedures Time Entry  Neuromuscular Re-Education Time Entry: 10  Therapeutic Exercise Time Entry: 38 -       Assessment:    Better MMT grades RICHARD hips/ significant improvement since evaluation.      Plan:    Continue per POC to improve ROM, flexibility and strength to decrease pain and allow return to prior level of function.        Irvin Palacios, PT

## 2024-10-17 ENCOUNTER — APPOINTMENT (OUTPATIENT)
Dept: PHYSICAL THERAPY | Facility: CLINIC | Age: 59
End: 2024-10-17
Payer: COMMERCIAL

## 2024-10-17 DIAGNOSIS — G89.29 CHRONIC RIGHT HIP PAIN: ICD-10-CM

## 2024-10-17 DIAGNOSIS — M16.11 OSTEOARTHRITIS OF RIGHT HIP, UNSPECIFIED OSTEOARTHRITIS TYPE: ICD-10-CM

## 2024-10-17 DIAGNOSIS — M25.551 CHRONIC RIGHT HIP PAIN: ICD-10-CM

## 2024-10-17 PROCEDURE — 97110 THERAPEUTIC EXERCISES: CPT | Mod: GP | Performed by: PHYSICAL THERAPIST

## 2024-10-17 ASSESSMENT — PAIN - FUNCTIONAL ASSESSMENT: PAIN_FUNCTIONAL_ASSESSMENT: 0-10

## 2024-10-17 ASSESSMENT — PAIN SCALES - GENERAL: PAINLEVEL_OUTOF10: 2

## 2024-10-17 NOTE — PROGRESS NOTES
"Physical Therapy  Physical Therapy Treatment    Patient Name: Delfino Lopez  MRN: [id number]  Today's Date: 10/17/2024  Time Calculation  Start Time: 1245  Stop Time: 1330  Time Calculation (min): 45 min    Visit # 4   Reason for visit: Right hip pain   Referring MD: Liu Maxwell  Insurance: AUTH NEEDED / NO OOP / 30 VISITS / [employer]   DX: Chronic right hip pain, OA right hip,   POC: 1-2/week 6-8 weeks     Current Problem  1. Osteoarthritis of right hip, unspecified osteoarthritis type  Follow Up In Physical Therapy      2. Chronic right hip pain  Follow Up In Physical Therapy          General  Reason for Referral: Right hip pain  Referred By: Liu Maxwell  Precautions  Precautions  STEADI Fall Risk Score (The score of 4 or more indicates an increased risk of falling): 3  Precautions Comment: none  Pain  Pain Assessment: 0-10  0-10 (Numeric) Pain Score: 2    Subjective:   Subjective   A couple days ago I felt really good.  Today it is a little more sore 2-3/10.    HEP compliance - yes     Objective:   Objective   Gait- increased base of support right LE,   Sit to stand favors left getting out of chair in waiting room.      Hip Strength MMT  Flex:L= 5/5 [] R= 5-/5   AbdL= 5/5 [] R= 4+/5  AddL= 5/5 [] R= 4+/5  ExtL= 4/5 [] R= 4/5      Below from eval-----------------------------------  Lumbar ROM  Flex=WNL  Ext=MOD  Side bend: L=MOD [] R=MOD  Rotation:    L=WNL [] R=WNL     Flexibility  Hamstrings: WNL  Piriformis: MOD  Quads: MIN  Hip flexors: MIN     Strength  Abdominals= Poor  Bridge = Good      Hip ROM  Flex:L= 109 [] R=  100   AbdL= 30  [] R= 35   ExtL= 7  [] R= 4   IRL= WFL  [] R= WFL   ERL= 40  [] R= 25      Hip Strength MMT  Flex:L= 5/5 [] R= 4/5   AbdL= 4+/5 [] R= 4/5  AddL= 5/5 [] R= 4/5  ExtL= 4/5 [] R= 4/5    Treatments:  Stepper L3 x5'   Erika slide abd x10 elly   Erika slide IR/ER x10 elly   DBE 2' x2 way   Tandem on airex 1' elly   Lateral step up 4\" x15 elly (Added to HEP)    Cybex hip abd/add 40# 2x15 " "  Multi hip ext 66# 2x15 elly    Leg press 80# 2x15   SKTC 20\" x3 elly   CONRAD for hip flexor stretch 1' -not performed    SLR 4 way 2x10 - in HEP    Bridge 2x12 - in HEP    LTR x20 - in HEP    TA with march 2x20 - in HEP    Fig 4 1' elly   Piriformis stretch 1' elly   Ian stretch 1' elly       Plan: Access Code: [email or web address]     Charges 3 TE     - PT Therapeutic Procedures Time Entry  Neuromuscular Re-Education Time Entry: 5  Therapeutic Exercise Time Entry: 40 -       Assessment:    Had some difficulty with adduction portion on valslide right hip, and with lateral step ups.        Plan:    Continue per POC to improve ROM, flexibility and strength to decrease pain and allow return to prior level of function.        Irvin Palacios, PT  "

## 2024-10-24 ENCOUNTER — TREATMENT (OUTPATIENT)
Dept: PHYSICAL THERAPY | Facility: CLINIC | Age: 59
End: 2024-10-24
Payer: COMMERCIAL

## 2024-10-24 DIAGNOSIS — M25.551 CHRONIC RIGHT HIP PAIN: ICD-10-CM

## 2024-10-24 DIAGNOSIS — M16.11 OSTEOARTHRITIS OF RIGHT HIP, UNSPECIFIED OSTEOARTHRITIS TYPE: ICD-10-CM

## 2024-10-24 DIAGNOSIS — G89.29 CHRONIC RIGHT HIP PAIN: ICD-10-CM

## 2024-10-24 PROCEDURE — 97110 THERAPEUTIC EXERCISES: CPT | Mod: GP | Performed by: PHYSICAL THERAPIST

## 2024-10-24 ASSESSMENT — PAIN - FUNCTIONAL ASSESSMENT: PAIN_FUNCTIONAL_ASSESSMENT: 0-10

## 2024-10-24 ASSESSMENT — PAIN SCALES - GENERAL: PAINLEVEL_OUTOF10: 1

## 2024-10-24 NOTE — PROGRESS NOTES
"Physical Therapy  Physical Therapy Treatment    Patient Name: Delfino Lopez  MRN: [id number]  Today's Date: 10/24/2024  Time Calculation  Start Time: 0750  Stop Time: 0830  Time Calculation (min): 40 min    Visit # 5   Reason for visit: Right hip pain   Referring MD: Liu Maxwell  Insurance: AUTH NEEDED / NO OOP / 30 VISITS / [employer]   DX: Chronic right hip pain, OA right hip,   POC: 1-2/week 6-8 weeks     Current Problem  1. Osteoarthritis of right hip, unspecified osteoarthritis type  Follow Up In Physical Therapy      2. Chronic right hip pain  Follow Up In Physical Therapy          General  Reason for Referral: Right hip pain  Referred By: Liu Maxwell  Precautions  Precautions  STEADI Fall Risk Score (The score of 4 or more indicates an increased risk of falling): 3  Precautions Comment: none  Pain  Pain Assessment: 0-10  0-10 (Numeric) Pain Score: 1    Subjective:   Subjective   Current pain 1/10.  The leg lift exercises bug me at times.    HEP compliance - yes     Objective:   Objective   Gait- increased base of support right LE, inc lat trunk lean      Hip Strength MMT  Flex:L= 5/5 [] R= 5-/5   AbdL= 5/5 [] R= 4+/5  AddL= 5/5 [] R= 4+/5  ExtL= 4/5 [] R= 4/5        Below from eval-----------------------------------  Lumbar ROM  Flex=WNL  Ext=MOD  Side bend: L=MOD [] R=MOD  Rotation:    L=WNL [] R=WNL     Flexibility  Hamstrings: WNL  Piriformis: MOD  Quads: MIN  Hip flexors: MIN     Strength  Abdominals= Poor  Bridge = Good      Hip ROM  Flex:L= 109 [] R=  100   AbdL= 30  [] R= 35   ExtL= 7  [] R= 4   IRL= WFL  [] R= WFL   ERL= 40  [] R= 25      Hip Strength MMT  Flex:L= 5/5 [] R= 4/5   AbdL= 4+/5 [] R= 4/5  AddL= 5/5 [] R= 4/5  ExtL= 4/5 [] R= 4/5    Treatments:  Stepper L3 x5'   Standing hip abd/ext 2x10 elly   Erika slide abd x10 elly -not performed    Erika slide IR/ER x10 elly   DBE 2' x2 way   Tandem walk in // bar 3 laps   Lateral step up 6\" x20 elly   Cybex hip abd/add 40# 2x15   Multi hip ext 66# 2x15 elly  " "  Leg press 80# 2x20   SKTC 20\" x3 elly -not performed    SLR 4 way 2x10 - in HEP    Bridge 2x12 - in HEP    LTR x20 - in HEP    TA with march 2x20 - in HEP    Fig 4 1' elly -not performed    Piriformis stretch 1' elly -not performed     Ian stretch 1' elly       Plan: Access Code: [email or web address]     Charges 3 TE     - PT Therapeutic Procedures Time Entry  Therapeutic Exercise Time Entry: 40 -       Assessment:    Had some difficulty with adduction portion on valslide right hip, and with lateral step ups.        Plan:    Continue per POC to improve ROM, flexibility and strength to decrease pain and allow return to prior level of function.        Irvin Palacios, PT  "

## 2024-10-30 ENCOUNTER — DOCUMENTATION (OUTPATIENT)
Dept: PHYSICAL THERAPY | Facility: CLINIC | Age: 59
End: 2024-10-30
Payer: COMMERCIAL

## 2024-11-01 DIAGNOSIS — Z00.00 ANNUAL PHYSICAL EXAM: ICD-10-CM

## 2024-11-01 DIAGNOSIS — I10 ESSENTIAL HYPERTENSION: ICD-10-CM

## 2024-11-01 DIAGNOSIS — M10.9 GOUT, UNSPECIFIED CAUSE, UNSPECIFIED CHRONICITY, UNSPECIFIED SITE: ICD-10-CM

## 2024-11-01 DIAGNOSIS — E11.9 TYPE 2 DIABETES MELLITUS WITHOUT COMPLICATION, UNSPECIFIED WHETHER LONG TERM INSULIN USE (MULTI): ICD-10-CM

## 2024-11-01 DIAGNOSIS — E78.5 HYPERLIPIDEMIA, UNSPECIFIED HYPERLIPIDEMIA TYPE: ICD-10-CM

## 2024-11-05 ENCOUNTER — APPOINTMENT (OUTPATIENT)
Dept: PRIMARY CARE | Facility: CLINIC | Age: 59
End: 2024-11-05
Payer: COMMERCIAL

## 2024-11-07 ENCOUNTER — DOCUMENTATION (OUTPATIENT)
Dept: PHYSICAL THERAPY | Facility: CLINIC | Age: 59
End: 2024-11-07
Payer: COMMERCIAL

## 2024-11-07 NOTE — PROGRESS NOTES
Physical Therapy  Patient No-Show Documentation       Delfion Lopez no showed for their visit on 11/7/2024. This is the 2nd occurrence.     Irvin Palacios, PT

## 2024-11-08 ENCOUNTER — APPOINTMENT (OUTPATIENT)
Dept: PRIMARY CARE | Facility: CLINIC | Age: 59
End: 2024-11-08
Payer: COMMERCIAL

## 2024-11-13 ENCOUNTER — TREATMENT (OUTPATIENT)
Dept: PHYSICAL THERAPY | Facility: CLINIC | Age: 59
End: 2024-11-13
Payer: COMMERCIAL

## 2024-11-13 DIAGNOSIS — M25.551 CHRONIC RIGHT HIP PAIN: ICD-10-CM

## 2024-11-13 DIAGNOSIS — M16.11 OSTEOARTHRITIS OF RIGHT HIP, UNSPECIFIED OSTEOARTHRITIS TYPE: ICD-10-CM

## 2024-11-13 DIAGNOSIS — G89.29 CHRONIC RIGHT HIP PAIN: ICD-10-CM

## 2024-11-13 PROCEDURE — 97110 THERAPEUTIC EXERCISES: CPT | Mod: GP | Performed by: PHYSICAL THERAPIST

## 2024-11-13 ASSESSMENT — PAIN SCALES - GENERAL: PAINLEVEL_OUTOF10: 1

## 2024-11-13 ASSESSMENT — PAIN - FUNCTIONAL ASSESSMENT: PAIN_FUNCTIONAL_ASSESSMENT: 0-10

## 2024-11-13 NOTE — PROGRESS NOTES
Physical Therapy  Physical Therapy Treatment    Patient Name: Delfino Lopez  MRN: [id number]  Today's Date: 11/13/2024  Time Calculation  Start Time: 1700  Stop Time: 1745  Time Calculation (min): 45 min    Visit # 6   Reason for visit: Right hip pain   Referring MD: Liu Maxwell  Insurance: AUTH NEEDED / NO OOP / 30 VISITS / [employer]   DX: Chronic right hip pain, OA right hip,   POC: 1-2/week 6-8 weeks     Current Problem  1. Osteoarthritis of right hip, unspecified osteoarthritis type  Follow Up In Physical Therapy      2. Chronic right hip pain  Follow Up In Physical Therapy            General  Reason for Referral: Right hip pain  Referred By: Liu Maxwell  Precautions  Precautions  STEADI Fall Risk Score (The score of 4 or more indicates an increased risk of falling): 3  Precautions Comment: none  Pain  Pain Assessment: 0-10  0-10 (Numeric) Pain Score: 1    Subjective:   Subjective   Current pain 0-1/10.  I have been doing more work than usual climbing ladders etc and it is going pretty good.  Still a little shaky.    HEP compliance - yes and no     Objective:   Objective   Gait- increased base of support right LE, inc lat trunk lean      Hip Strength MMT  Flex:L= 5/5 [] R= 5-/5   AbdL= 5/5 [] R= 4+/5  AddL= 5/5 [] R= 4+/5  ExtL= 4/5 [] R= 4/5        Below from eval-----------------------------------  Lumbar ROM  Flex=WNL  Ext=MOD  Side bend: L=MOD [] R=MOD  Rotation:    L=WNL [] R=WNL     Flexibility  Hamstrings: WNL  Piriformis: MOD  Quads: MIN  Hip flexors: MIN     Strength  Abdominals= Poor  Bridge = Good      Hip ROM  Flex:L= 109 [] R=  100   AbdL= 30  [] R= 35   ExtL= 7  [] R= 4   IRL= WFL  [] R= WFL   ERL= 40  [] R= 25      Hip Strength MMT  Flex:L= 5/5 [] R= 4/5   AbdL= 4+/5 [] R= 4/5  AddL= 5/5 [] R= 4/5  ExtL= 4/5 [] R= 4/5    Treatments:  Stepper L3 x5'   Standing hip abd/ext 2x10 elly   Side BOSU lunge x20 elly   Fwd Bosu lunge x20 elly    Stool IR/ER x20 elly   DBE 2' x2 way   Tandem walk in // bar 3  "laps   Lateral step up 6\" x20 elly   Cybex hip abd/add 47.5# 2x15   Multi hip ext 77#/88# 2x15 elly    Leg press 100#, 110#  2x15  SKTC 20\" x3 elly -not performed    SLR 4 way 2x10 - in HEP    Bridge 2x12 - in HEP    LTR x20 - in HEP    TA with march 2x20 - in HEP    Fig 4 1' elly -not performed    Piriformis stretch 1' elly -not performed         Plan: Access Code: [email or web address]     Charges 3 TE     - PT Therapeutic Procedures Time Entry  Therapeutic Exercise Time Entry: 45 -       Assessment:    Tolerated today's session without complaint of pain or problem.  Tolerated increased weight on cybex well.          Plan:    Continue per POC to improve ROM, flexibility and strength to decrease pain and allow return to prior level of function.  Extending POC 1 session per week 4- 6 weeks       Irvin Palacios, PT  "

## 2024-11-19 ENCOUNTER — APPOINTMENT (OUTPATIENT)
Dept: PRIMARY CARE | Facility: CLINIC | Age: 59
End: 2024-11-19
Payer: COMMERCIAL

## 2024-11-19 DIAGNOSIS — E78.00 ELEVATED LDL CHOLESTEROL LEVEL: ICD-10-CM

## 2024-11-19 DIAGNOSIS — I10 BENIGN ESSENTIAL HYPERTENSION: ICD-10-CM

## 2024-11-19 DIAGNOSIS — D50.9 IRON DEFICIENCY ANEMIA, UNSPECIFIED IRON DEFICIENCY ANEMIA TYPE: ICD-10-CM

## 2024-11-19 DIAGNOSIS — E11.69 TYPE 2 DIABETES MELLITUS WITH OTHER SPECIFIED COMPLICATION, UNSPECIFIED WHETHER LONG TERM INSULIN USE (MULTI): ICD-10-CM

## 2024-11-19 DIAGNOSIS — E78.5 HYPERLIPIDEMIA, UNSPECIFIED HYPERLIPIDEMIA TYPE: ICD-10-CM

## 2024-11-19 DIAGNOSIS — Z12.5 SCREENING PSA (PROSTATE SPECIFIC ANTIGEN): ICD-10-CM

## 2024-11-19 DIAGNOSIS — I10 ESSENTIAL HYPERTENSION: ICD-10-CM

## 2024-11-19 DIAGNOSIS — E03.9 HYPOTHYROIDISM, UNSPECIFIED TYPE: ICD-10-CM

## 2024-11-19 DIAGNOSIS — Z00.00 ENCOUNTER FOR ANNUAL PHYSICAL EXAM: ICD-10-CM

## 2024-11-19 DIAGNOSIS — M10.9 GOUT, UNSPECIFIED CAUSE, UNSPECIFIED CHRONICITY, UNSPECIFIED SITE: ICD-10-CM

## 2024-11-19 DIAGNOSIS — I10 PRIMARY HYPERTENSION: ICD-10-CM

## 2024-11-19 LAB
25(OH)D3 SERPL-MCNC: 28 NG/ML (ref 30–100)
ALBUMIN SERPL BCP-MCNC: 4.4 G/DL (ref 3.4–5)
ALP SERPL-CCNC: 65 U/L (ref 33–136)
ALT SERPL W P-5'-P-CCNC: 44 U/L (ref 10–52)
ANION GAP SERPL CALC-SCNC: 15 MMOL/L (ref 10–20)
AST SERPL W P-5'-P-CCNC: 33 U/L (ref 9–39)
BILIRUB SERPL-MCNC: 0.6 MG/DL (ref 0–1.2)
BUN SERPL-MCNC: 20 MG/DL (ref 6–23)
CALCIUM SERPL-MCNC: 8.9 MG/DL (ref 8.6–10.6)
CHLORIDE SERPL-SCNC: 100 MMOL/L (ref 98–107)
CHOLEST SERPL-MCNC: 132 MG/DL (ref 0–199)
CHOLESTEROL/HDL RATIO: 2.9
CO2 SERPL-SCNC: 27 MMOL/L (ref 21–32)
CREAT SERPL-MCNC: 1.11 MG/DL (ref 0.5–1.3)
EGFRCR SERPLBLD CKD-EPI 2021: 76 ML/MIN/1.73M*2
ERYTHROCYTE [DISTWIDTH] IN BLOOD BY AUTOMATED COUNT: 13.8 % (ref 11.5–14.5)
EST. AVERAGE GLUCOSE BLD GHB EST-MCNC: 134 MG/DL
GLUCOSE SERPL-MCNC: 102 MG/DL (ref 74–99)
HBA1C MFR BLD: 6.3 %
HCT VFR BLD AUTO: 47 % (ref 41–52)
HDLC SERPL-MCNC: 45 MG/DL
HGB BLD-MCNC: 14.7 G/DL (ref 13.5–17.5)
LDLC SERPL CALC-MCNC: 56 MG/DL
MCH RBC QN AUTO: 28.7 PG (ref 26–34)
MCHC RBC AUTO-ENTMCNC: 31.3 G/DL (ref 32–36)
MCV RBC AUTO: 92 FL (ref 80–100)
NON HDL CHOLESTEROL: 87 MG/DL (ref 0–149)
NRBC BLD-RTO: 0 /100 WBCS (ref 0–0)
PLATELET # BLD AUTO: 225 X10*3/UL (ref 150–450)
POTASSIUM SERPL-SCNC: 4.3 MMOL/L (ref 3.5–5.3)
PROT SERPL-MCNC: 6.6 G/DL (ref 6.4–8.2)
PSA SERPL-MCNC: 1.07 NG/ML
RBC # BLD AUTO: 5.13 X10*6/UL (ref 4.5–5.9)
SODIUM SERPL-SCNC: 138 MMOL/L (ref 136–145)
TRIGL SERPL-MCNC: 155 MG/DL (ref 0–149)
TSH SERPL-ACNC: 3.34 MIU/L (ref 0.44–3.98)
URATE SERPL-MCNC: 5.9 MG/DL (ref 4–7.5)
VLDL: 31 MG/DL (ref 0–40)
WBC # BLD AUTO: 8 X10*3/UL (ref 4.4–11.3)

## 2024-11-19 PROCEDURE — 85027 COMPLETE CBC AUTOMATED: CPT

## 2024-11-19 PROCEDURE — 80061 LIPID PANEL: CPT

## 2024-11-19 PROCEDURE — 80053 COMPREHEN METABOLIC PANEL: CPT

## 2024-11-19 PROCEDURE — 83036 HEMOGLOBIN GLYCOSYLATED A1C: CPT

## 2024-11-19 PROCEDURE — 82306 VITAMIN D 25 HYDROXY: CPT

## 2024-11-19 PROCEDURE — 84550 ASSAY OF BLOOD/URIC ACID: CPT

## 2024-11-19 PROCEDURE — 84443 ASSAY THYROID STIM HORMONE: CPT

## 2024-11-19 PROCEDURE — 84153 ASSAY OF PSA TOTAL: CPT

## 2024-11-21 ENCOUNTER — TREATMENT (OUTPATIENT)
Dept: PHYSICAL THERAPY | Facility: CLINIC | Age: 59
End: 2024-11-21
Payer: COMMERCIAL

## 2024-11-21 DIAGNOSIS — M25.551 CHRONIC RIGHT HIP PAIN: ICD-10-CM

## 2024-11-21 DIAGNOSIS — M16.11 OSTEOARTHRITIS OF RIGHT HIP, UNSPECIFIED OSTEOARTHRITIS TYPE: ICD-10-CM

## 2024-11-21 DIAGNOSIS — G89.29 CHRONIC RIGHT HIP PAIN: ICD-10-CM

## 2024-11-21 PROCEDURE — 97110 THERAPEUTIC EXERCISES: CPT | Mod: GP | Performed by: PHYSICAL THERAPIST

## 2024-11-21 ASSESSMENT — PAIN SCALES - GENERAL: PAINLEVEL_OUTOF10: 0 - NO PAIN

## 2024-11-21 ASSESSMENT — PAIN - FUNCTIONAL ASSESSMENT: PAIN_FUNCTIONAL_ASSESSMENT: 0-10

## 2024-11-21 NOTE — PROGRESS NOTES
"Physical Therapy  Physical Therapy Treatment    Patient Name: Delfino Lopez  MRN: [id number]  Today's Date: 11/21/2024  Time Calculation  Start Time: 1130  Stop Time: 1215  Time Calculation (min): 45 min    Visit # 7   Reason for visit: Right hip pain   Referring MD: Liu Maxwell  Insurance: AUTH NEEDED / NO OOP / 30 VISITS / [employer]   DX: Chronic right hip pain, OA right hip,   POC: 1-2/week 6-8 weeks     Current Problem  1. Osteoarthritis of right hip, unspecified osteoarthritis type  Follow Up In Physical Therapy      2. Chronic right hip pain  Follow Up In Physical Therapy            General  Reason for Referral: Right hip pain  Referred By: Liu Maxwell  Precautions  Precautions  STEADI Fall Risk Score (The score of 4 or more indicates an increased risk of falling): 3  Precautions Comment: none  Pain  Pain Assessment: 0-10  0-10 (Numeric) Pain Score: 0 - No pain    Subjective:   Subjective   Current pain 0/10.  Overall the joint feels better.  I did have some pain in the groin after last session.    HEP compliance - some I have been really busy     Objective:   Objective   Gait- mild antalgic     Hip Strength MMT  Flex:L= 5/5 [] R= 5-/5   AbdL= 5/5 [] R= 4+/5  AddL= 5/5 [] R= 4+/5  ExtL= 4/5 [] R= 4/5        Below from eval-----------------------------------  Lumbar ROM  Flex=WNL  Ext=MOD  Side bend: L=MOD [] R=MOD  Rotation:    L=WNL [] R=WNL     Flexibility  Hamstrings: WNL  Piriformis: MOD  Quads: MIN  Hip flexors: MIN     Strength  Abdominals= Poor  Bridge = Good      Hip ROM  Flex:L= 109 [] R=  100   AbdL= 30  [] R= 35   ExtL= 7  [] R= 4   IRL= WFL  [] R= WFL   ERL= 40  [] R= 25      Hip Strength MMT  Flex:L= 5/5 [] R= 4/5   AbdL= 4+/5 [] R= 4/5  AddL= 5/5 [] R= 4/5  ExtL= 4/5 [] R= 4/5    Treatments:  Stepper L3 x5'   Side BOSU lunge x20 elly   Fwd Bosu lunge x20 elly    Stool IR/ER x20 elly   DBE 2' x2 way   Tandem walk in // bar 3 laps   SLS on AIREX 1' elly   Lateral step up 6\" x20 elly -not performed  " "  Cybex hip abd/add 47.5# 2x15 (abd only 11/21/24)   Multi hip ext 88# 2x15 elly    Leg press 110#  2x15  SKTC 20\" x3 elly -not performed        Fig 4 1' elly -not performed    Piriformis stretch 1' elly -not performed         Plan: Access Code: [email or web address]     Charges 3 TE     - PT Therapeutic Procedures Time Entry  Therapeutic Exercise Time Entry: 45 -       Assessment:    Tolerated today's session without complaint of pain or problem.  Tolerated increased weight on cybex well.      Held adduction on cybex this session.      Plan:    Continue per POC to improve ROM, flexibility and strength to decrease pain and allow return to prior level of function.  Extending POC 1 session per week 4- 6 weeks       Irvin Palacios, PT  "

## 2024-11-22 ENCOUNTER — TELEPHONE (OUTPATIENT)
Dept: PRIMARY CARE | Facility: CLINIC | Age: 59
End: 2024-11-22

## 2024-11-22 ENCOUNTER — APPOINTMENT (OUTPATIENT)
Dept: PRIMARY CARE | Facility: CLINIC | Age: 59
End: 2024-11-22
Payer: COMMERCIAL

## 2024-11-22 VITALS
WEIGHT: 220 LBS | HEART RATE: 71 BPM | SYSTOLIC BLOOD PRESSURE: 110 MMHG | RESPIRATION RATE: 12 BRPM | BODY MASS INDEX: 34.46 KG/M2 | OXYGEN SATURATION: 96 % | DIASTOLIC BLOOD PRESSURE: 68 MMHG

## 2024-11-22 DIAGNOSIS — K40.90 NON-RECURRENT UNILATERAL INGUINAL HERNIA WITHOUT OBSTRUCTION OR GANGRENE: ICD-10-CM

## 2024-11-22 DIAGNOSIS — E11.69 TYPE 2 DIABETES MELLITUS WITH OTHER SPECIFIED COMPLICATION, UNSPECIFIED WHETHER LONG TERM INSULIN USE (MULTI): ICD-10-CM

## 2024-11-22 DIAGNOSIS — I10 ESSENTIAL HYPERTENSION: ICD-10-CM

## 2024-11-22 DIAGNOSIS — E78.2 MIXED HYPERLIPIDEMIA: ICD-10-CM

## 2024-11-22 DIAGNOSIS — E55.9 VITAMIN D DEFICIENCY: ICD-10-CM

## 2024-11-22 DIAGNOSIS — T56.0X1D LEAD-INDUCED CHRONIC GOUT WITHOUT TOPHUS, UNSPECIFIED SITE, SUBSEQUENT ENCOUNTER: Primary | ICD-10-CM

## 2024-11-22 DIAGNOSIS — Z00.00 ENCOUNTER FOR ANNUAL PHYSICAL EXAM: ICD-10-CM

## 2024-11-22 DIAGNOSIS — M1A.10X0 LEAD-INDUCED CHRONIC GOUT WITHOUT TOPHUS, UNSPECIFIED SITE, SUBSEQUENT ENCOUNTER: Primary | ICD-10-CM

## 2024-11-22 DIAGNOSIS — Z23 NEED FOR VACCINE FOR DT (DIPHTHERIA-TETANUS): ICD-10-CM

## 2024-11-22 DIAGNOSIS — Z23 NEEDS FLU SHOT: ICD-10-CM

## 2024-11-22 DIAGNOSIS — E11.9 DIABETES MELLITUS (MULTI): ICD-10-CM

## 2024-11-22 DIAGNOSIS — E78.5 HYPERLIPIDEMIA: Primary | ICD-10-CM

## 2024-11-22 DIAGNOSIS — Z12.11 COLON CANCER SCREENING: ICD-10-CM

## 2024-11-22 LAB
POC APPEARANCE, URINE: CLEAR
POC BILIRUBIN, URINE: NEGATIVE
POC BLOOD, URINE: NEGATIVE
POC COLOR, URINE: YELLOW
POC FINGERSTICK BLOOD GLUCOSE: 115 MG/DL (ref 70–100)
POC GLUCOSE, URINE: NEGATIVE MG/DL
POC KETONES, URINE: NEGATIVE MG/DL
POC LEUKOCYTES, URINE: NEGATIVE
POC NITRITE,URINE: NEGATIVE
POC PH, URINE: 5 PH
POC PROTEIN, URINE: NEGATIVE MG/DL
POC SPECIFIC GRAVITY, URINE: 1.01
POC UROBILINOGEN, URINE: 0.2 EU/DL

## 2024-11-22 NOTE — PROGRESS NOTES
ANNUAL WELLNESS VISIT    Subjective :  Chief Complaint: Delfino Lopez is an 59 y.o. male here for an annual wellness visit and general medical care and f/u.     HPI:  Annual wellness exam        Objective   /68   Pulse 71   Resp 12   Wt 99.8 kg (220 lb)   SpO2 96%   BMI 34.46 kg/m²     Physical Exam  Vitals reviewed.   Constitutional:       Appearance: Normal appearance.   HENT:      Head: Normocephalic and atraumatic.   Cardiovascular:      Rate and Rhythm: Normal rate and regular rhythm.   Pulmonary:      Effort: Pulmonary effort is normal.      Breath sounds: Normal breath sounds.   Abdominal:      General: Bowel sounds are normal.      Palpations: Abdomen is soft.   Musculoskeletal:      Cervical back: Neck supple.   Skin:     General: Skin is warm and dry.   Neurological:      General: No focal deficit present.      Mental Status: He is alert.   Psychiatric:         Mood and Affect: Mood normal.         Behavior: Behavior is cooperative.         Imaging:  No results found.     Labs reviewed:    Lab Results   Component Value Date    WBC 8.0 11/19/2024    HGB 14.7 11/19/2024    HCT 47.0 11/19/2024     11/19/2024    CHOL 132 11/19/2024    TRIG 155 (H) 11/19/2024    HDL 45.0 11/19/2024    ALT 44 11/19/2024    AST 33 11/19/2024     11/19/2024    K 4.3 11/19/2024     11/19/2024    CREATININE 1.11 11/19/2024    BUN 20 11/19/2024    CO2 27 11/19/2024    TSH 3.34 11/19/2024    PSA 0.74 09/24/2020    HGBA1C 6.3 (H) 11/19/2024       Past Medical, Surgical, and Family History reviewed and updated in chart.    I have reviewed and reconciled the medication list with the patient today.   Current Outpatient Medications:     allopurinol (Zyloprim) 300 mg tablet, Take 1 tablet (300 mg) by mouth once daily., Disp: 90 tablet, Rfl: 3    atorvastatin (Lipitor) 10 mg tablet, TAKE 1 TABLET BY MOUTH EVERY DAY, Disp: 90 tablet, Rfl: 3    flaxseed oiL 1,000 mg capsule, Take 1 capsule (1,000 mg) by mouth 3  times a day., Disp: , Rfl:     glucosamine-chondroitin 500-400 mg tablet, Take 1 tablet by mouth once daily in the morning., Disp: , Rfl:     lisinopriL-hydrochlorothiazide 10-12.5 mg tablet, Take 1.5 tablets by mouth once daily. Take 1.5 tablet daily, Disp: 135 tablet, Rfl: 0    metFORMIN (Glucophage) 500 mg tablet, Take 1 tablet (500 mg) by mouth in the morning and 1 tablet (500 mg) before bedtime., Disp: 180 tablet, Rfl: 3    multivitamin/iron/folic acid (CENTRUM ORAL), Take by mouth., Disp: , Rfl:     omega 3-dha-epa-fish oil 360 mg-108 mg- 180 mg-1,200 mg capsule, Take 1 capsule by mouth 3 times daily (morning, midday, late afternoon)., Disp: , Rfl:     turmeric (CURCUMIN MISC), Take by mouth. TUMERIC CURCUMIN ORAL CAPSULE, Disp: , Rfl:      List of current healthcare providers:  Patient Care Team:  Liu Maxwell MD as PCP - General  Liu Maxwell MD as PCP - [facility]CP  Liu Maxwell MD as PCP - [facility] Medicaid PCP     HRA:       Steadi Fall Risk  One or more falls in the last year?    How many Times?    Was the patient injured in the fall?    Has trouble stepping onto curb?    Advised to use a cane or walker to get around safely?    Often has to rush to toilet?    Feels unsteady when walking?    Has lost some feeling in feet?    Often feels sad or depressed?    Steadies self on furniture while walking at home?    Takes medicine that makes them feel lightheaded or more tired than usual?    Worried about Falling?    Takes medicine to sleep or improve mood?    Needs to push with hands when rising from a chair?                                            Assessment/Plan :  Problem List Items Addressed This Visit       Diabetes mellitus (Multi)    Relevant Orders    POCT fingerstick glucose manually resulted (Completed)    Essential hypertension    Relevant Orders    ECG 12 lead (Clinic Performed)    Encounter for annual physical exam    Relevant Orders    POCT fingerstick glucose manually resulted  (Completed)    ECG 12 lead (Clinic Performed)    POCT UA (nonautomated) manually resulted (Completed)     Other Visit Diagnoses       Needs flu shot        Relevant Orders    Flu vaccine, trivalent, preservative free, age 6 months and greater (Fluraix/Fluzone/Flulaval) (Completed)    Colon cancer screening        Relevant Orders    Cologuard® colon cancer screening    Need for vaccine for DT (diphtheria-tetanus)        Relevant Orders    Tdap vaccine, age 7 years and older (ADACEL) (Completed)          The following health maintenance schedule was reviewed with the patient and provided in printed form in the after visit summary:  Health Maintenance   Topic Date Due    MMR Vaccines (1 of 1 - Standard series) Never done    Pneumococcal Vaccine: Pediatrics (0 to 5 Years) and At-Risk Patients (6 to 64 Years) (1 of 2 - PCV) Never done    Diabetes: Retinopathy Screening  Never done    Zoster Vaccines (1 of 2) Never done    Diabetes: Urine Protein Screening  09/24/2021    Colorectal Cancer Screening  11/19/2023    COVID-19 Vaccine (5 - 2024-25 season) 09/01/2024    Yearly Adult Physical  11/29/2024    Diabetes: Hemoglobin A1C  02/19/2025    TSH Level  11/19/2025    Lipid Panel  11/19/2025    DTaP/Tdap/Td Vaccines (3 - Td or Tdap) 11/22/2034    RSV High Risk: (Elderly (60+) or Pregnant Population) (1 - 1-dose 75+ series) 02/13/2038    Influenza Vaccine  Completed    HIV Screening  Completed    Hepatitis C Screening  Completed    HIB Vaccines  Aged Out    Hepatitis B Vaccines  Aged Out    IPV Vaccines  Aged Out    Hepatitis A Vaccines  Aged Out    Meningococcal Vaccine  Aged Out    Rotavirus Vaccines  Aged Out    HPV Vaccines  Aged Out       Advance Care Planning   No             Orders Placed This Encounter   Procedures    Flu vaccine, trivalent, preservative free, age 6 months and greater (Fluraix/Fluzone/Flulaval)    Tdap vaccine, age 7 years and older (ADACEL)    Cologuard® colon cancer screening     Standing Status:    Future     Number of Occurrences:   1     Standing Expiration Date:   11/22/2025     Order Specific Question:   Release result to MyChart     Answer:   Immediate    POCT fingerstick glucose manually resulted     Order Specific Question:   Release result to MyChart     Answer:   Immediate [1]    POCT UA (nonautomated) manually resulted     Order Specific Question:   Release result to MyChart     Answer:   Immediate [1]    ECG 12 lead (Clinic Performed)     Order Specific Question:   Reason for Exam:     Answer:   physical       Continue current medications as listed  Follow up in 3 months check A1c BMP vitamin D

## 2024-11-27 ENCOUNTER — TREATMENT (OUTPATIENT)
Dept: PHYSICAL THERAPY | Facility: CLINIC | Age: 59
End: 2024-11-27
Payer: COMMERCIAL

## 2024-11-27 DIAGNOSIS — G89.29 CHRONIC RIGHT HIP PAIN: ICD-10-CM

## 2024-11-27 DIAGNOSIS — M16.11 OSTEOARTHRITIS OF RIGHT HIP, UNSPECIFIED OSTEOARTHRITIS TYPE: ICD-10-CM

## 2024-11-27 DIAGNOSIS — M25.551 CHRONIC RIGHT HIP PAIN: ICD-10-CM

## 2024-11-27 PROCEDURE — 97110 THERAPEUTIC EXERCISES: CPT | Mod: GP | Performed by: PHYSICAL THERAPIST

## 2024-11-27 ASSESSMENT — PAIN SCALES - GENERAL: PAINLEVEL_OUTOF10: 0 - NO PAIN

## 2024-11-27 ASSESSMENT — PAIN - FUNCTIONAL ASSESSMENT: PAIN_FUNCTIONAL_ASSESSMENT: 0-10

## 2024-11-27 NOTE — PROGRESS NOTES
Physical Therapy  Physical Therapy Treatment    Patient Name: Delfino Lopez  MRN: [id number]  Today's Date: 11/27/2024  Time Calculation  Start Time: 1445  Stop Time: 1526  Time Calculation (min): 41 min    Visit # 8   Reason for visit: Right hip pain   Referring MD: Liu Maxwell  Insurance: AUTH NEEDED / NO OOP / 30 VISITS / [employer]   DX: Chronic right hip pain, OA right hip,   POC: 1-2/week 6-8 weeks     Current Problem  1. Osteoarthritis of right hip, unspecified osteoarthritis type  Follow Up In Physical Therapy      2. Chronic right hip pain  Follow Up In Physical Therapy            General  Reason for Referral: Right hip pain  Referred By: Liu Maxwell  Precautions  Precautions  STEADI Fall Risk Score (The score of 4 or more indicates an increased risk of falling): 3  Precautions Comment: none  Pain  Pain Assessment: 0-10  0-10 (Numeric) Pain Score: 0 - No pain    Subjective:   Subjective   Current pain 0/10.  Has had some pain but more in the joint after last time.    HEP compliance - some I have been really busy     Objective:   Objective   Gait- mild antalgic     Hip Strength MMT  Flex:L= 5/5 [] R= 5-/5   AbdL= 5/5 [] R= 4+/5  AddL= 5/5 [] R= 4+/5  ExtL= 4/5 [] R= 4/5        Below from eval-----------------------------------  Lumbar ROM  Flex=WNL  Ext=MOD  Side bend: L=MOD [] R=MOD  Rotation:    L=WNL [] R=WNL     Flexibility  Hamstrings: WNL  Piriformis: MOD  Quads: MIN  Hip flexors: MIN     Strength  Abdominals= Poor  Bridge = Good      Hip ROM  Flex:L= 109 [] R=  100   AbdL= 30  [] R= 35   ExtL= 7  [] R= 4   IRL= WFL  [] R= WFL   ERL= 40  [] R= 25      Hip Strength MMT  Flex:L= 5/5 [] R= 4/5   AbdL= 4+/5 [] R= 4/5  AddL= 5/5 [] R= 4/5  ExtL= 4/5 [] R= 4/5    Treatments:  Stepper L3 x5'   Side BOSU lunge x20 elly -not performed    Fwd Bosu lunge x20 elly  -not performed    Stool IR/ER x20 elly -not performed    DBE 2' x2 way   Tandem walk in // bar 3 laps  (fwd/back)   SLS on AIREX 1' elly   Runner step up  "6\" x10 elly   Lateral step up 6\" x15 elly  Multi hip flexion 33# 2x10 elly   Cybex hip abd/add 47.5# 2x15   Multi hip ext 88# 2x15 elly    Leg press 110#  2x15  SKTC 20\" x3 elly -not performed        Fig 4 1' elly -not performed    Piriformis stretch 1' elly -not performed         Plan: Access Code: [email or web address]     Charges 3 TE     - PT Therapeutic Procedures Time Entry  Therapeutic Exercise Time Entry: 41 -       Assessment:    Tolerated today's session without complaint of pain or problem.  Modified session based on subjective reports.  Held JAMIE avery, as patient has had discomfort after sessions since adding them.      Plan:    Continue per POC to improve ROM, flexibility and strength to decrease pain and allow return to prior level of function.  Extending POC 1 session per week 4- 6 weeks       Irvin Palacios, PT  "

## 2024-12-05 ENCOUNTER — APPOINTMENT (OUTPATIENT)
Dept: PHYSICAL THERAPY | Facility: CLINIC | Age: 59
End: 2024-12-05
Payer: COMMERCIAL

## 2024-12-05 ENCOUNTER — TREATMENT (OUTPATIENT)
Dept: PHYSICAL THERAPY | Facility: CLINIC | Age: 59
End: 2024-12-05
Payer: COMMERCIAL

## 2024-12-05 DIAGNOSIS — M25.551 CHRONIC RIGHT HIP PAIN: ICD-10-CM

## 2024-12-05 DIAGNOSIS — M16.11 OSTEOARTHRITIS OF RIGHT HIP, UNSPECIFIED OSTEOARTHRITIS TYPE: ICD-10-CM

## 2024-12-05 DIAGNOSIS — G89.29 CHRONIC RIGHT HIP PAIN: ICD-10-CM

## 2024-12-05 PROCEDURE — 97110 THERAPEUTIC EXERCISES: CPT | Mod: GP | Performed by: PHYSICAL THERAPIST

## 2024-12-05 PROCEDURE — 97112 NEUROMUSCULAR REEDUCATION: CPT | Mod: GP | Performed by: PHYSICAL THERAPIST

## 2024-12-05 ASSESSMENT — PAIN - FUNCTIONAL ASSESSMENT: PAIN_FUNCTIONAL_ASSESSMENT: 0-10

## 2024-12-05 ASSESSMENT — PAIN SCALES - GENERAL: PAINLEVEL_OUTOF10: 1

## 2024-12-05 NOTE — PROGRESS NOTES
Physical Therapy  Physical Therapy Treatment    Patient Name: Delfino Lopez  MRN: [id number]  Today's Date: 12/5/2024  Time Calculation  Start Time: 1307  Stop Time: 1357  Time Calculation (min): 50 min    Visit # 9   Reason for visit: Right hip pain   Referring MD: Liu Maxwell  Insurance: AUTH NEEDED / NO OOP / 30 VISITS / [employer]   DX: Chronic right hip pain, OA right hip,   POC: 1-2/week 6-8 weeks     Current Problem  1. Osteoarthritis of right hip, unspecified osteoarthritis type  Follow Up In Physical Therapy      2. Chronic right hip pain  Follow Up In Physical Therapy            General  Reason for Referral: Right hip pain  Referred By: Liu Maxwell  Precautions  Precautions  STEADI Fall Risk Score (The score of 4 or more indicates an increased risk of falling): 3  Precautions Comment: none  Pain  Pain Assessment: 0-10  0-10 (Numeric) Pain Score: 1    Subjective:   Subjective   Current pain 0/10.  Has had some soreness this week.  I did start slacking off of the stretches and I have not been doing my inversion table, which could be contributing to my pain.  Current pain 1/10  HEP compliance - I started the last few days again.      Objective:   Objective   Gait- mild antalgic     Hip Strength MMT  Flex:L= 5/5 [] R= 5-/5   AbdL= 5/5 [] R= 4+/5  AddL= 5/5 [] R= 4+/5  ExtL= 4/5 [] R= 4/5        Below from eval-----------------------------------  Lumbar ROM  Flex=WNL  Ext=MOD  Side bend: L=MOD [] R=MOD  Rotation:    L=WNL [] R=WNL     Flexibility  Hamstrings: WNL  Piriformis: MOD  Quads: MIN  Hip flexors: MIN     Strength  Abdominals= Poor  Bridge = Good      Hip ROM  Flex:L= 109 [] R=  100   AbdL= 30  [] R= 35   ExtL= 7  [] R= 4   IRL= WFL  [] R= WFL   ERL= 40  [] R= 25      Hip Strength MMT  Flex:L= 5/5 [] R= 4/5   AbdL= 4+/5 [] R= 4/5  AddL= 5/5 [] R= 4/5  ExtL= 4/5 [] R= 4/5    Treatments:  Stepper L3 x5'   Biodex balance - L12 21%, 13%  Hip loop abd/ext/flex 3x10 elly RTB (Added to HEP)    LS flexion seated  "5\" x10   Side BOSU lunge x20 elly -not performed    Fwd Bosu lunge x20 elly  -not performed    DBE 2' x2 way   Tandem walk in // bar 3 laps  (fwd/back)   SLS on AIREX 1' elly   Tandem KB swing 4kg x10 cw/ccw   Runner step up 6\" x10 elly   Multi hip flexion 33# 2x10 elly -not performed    Cybex hip abd/add 47.5# 2x15  -not performed    Multi hip ext 88# 2x15 elly   -not performed    Leg press 120#  2x15  SKTC 20\" x3 elly -not performed        Fig 4 1' elly seated -not performed    Piriformis stretch 1' elly seated -not performed          Plan: Access Code: [email or web address]     Charges 2 TE 1 NME      - PT Therapeutic Procedures Time Entry  Neuromuscular Re-Education Time Entry: 12  Therapeutic Exercise Time Entry: 38 -       Assessment:    Tolerated today's session without complaint of pain or problem. More problems reported with low back than hip.  Seated back flexion lessened pain.      Plan:    Continue per POC to improve ROM, flexibility and strength to decrease pain and allow return to prior level of function.  2 visits remaining per extended POC.        Irvin Palacios, PT  "

## 2024-12-10 DIAGNOSIS — I10 ESSENTIAL HYPERTENSION: ICD-10-CM

## 2024-12-10 RX ORDER — LISINOPRIL AND HYDROCHLOROTHIAZIDE 10; 12.5 MG/1; MG/1
1.5 TABLET ORAL DAILY
Qty: 135 TABLET | Refills: 3 | Status: SHIPPED | OUTPATIENT
Start: 2024-12-10

## 2024-12-12 ENCOUNTER — TREATMENT (OUTPATIENT)
Dept: PHYSICAL THERAPY | Facility: CLINIC | Age: 59
End: 2024-12-12
Payer: COMMERCIAL

## 2024-12-12 ENCOUNTER — APPOINTMENT (OUTPATIENT)
Dept: PHYSICAL THERAPY | Facility: CLINIC | Age: 59
End: 2024-12-12
Payer: COMMERCIAL

## 2024-12-12 DIAGNOSIS — G89.29 CHRONIC RIGHT HIP PAIN: ICD-10-CM

## 2024-12-12 DIAGNOSIS — M25.551 CHRONIC RIGHT HIP PAIN: ICD-10-CM

## 2024-12-12 DIAGNOSIS — M16.11 OSTEOARTHRITIS OF RIGHT HIP, UNSPECIFIED OSTEOARTHRITIS TYPE: ICD-10-CM

## 2024-12-12 PROCEDURE — 97112 NEUROMUSCULAR REEDUCATION: CPT | Mod: GP | Performed by: PHYSICAL THERAPIST

## 2024-12-12 PROCEDURE — 97110 THERAPEUTIC EXERCISES: CPT | Mod: GP | Performed by: PHYSICAL THERAPIST

## 2024-12-12 ASSESSMENT — PAIN - FUNCTIONAL ASSESSMENT: PAIN_FUNCTIONAL_ASSESSMENT: 0-10

## 2024-12-12 ASSESSMENT — PAIN SCALES - GENERAL: PAINLEVEL_OUTOF10: 1

## 2024-12-12 NOTE — PROGRESS NOTES
"Physical Therapy  Physical Therapy Treatment    Patient Name: Delfino Lopez  MRN: [id number]  Today's Date: 12/12/2024  Time Calculation  Start Time: 1045  Stop Time: 1130  Time Calculation (min): 45 min    Visit # 10   Reason for visit: Right hip pain   Referring MD: Liu Maxwell  Insurance: AUTH NEEDED / NO OOP / 30 VISITS / [employer]   DX: Chronic right hip pain, OA right hip,   POC: 1-2/week 6-8 weeks     Current Problem  1. Osteoarthritis of right hip, unspecified osteoarthritis type  Follow Up In Physical Therapy      2. Chronic right hip pain  Follow Up In Physical Therapy            General  Reason for Referral: Right hip pain  Referred By: Liu Maxwell  Precautions  Precautions  STEADI Fall Risk Score (The score of 4 or more indicates an increased risk of falling): 3  Precautions Comment: none  Pain  Pain Assessment: 0-10  0-10 (Numeric) Pain Score: 1    Subjective:   Subjective   Current pain 1-2/10.  Did have pain the day after last session.  New band exercises did seem to tweak it a bit.    HEP compliance -yes     Objective:   Objective   Gait- mild antalgic     Hip Strength MMT  Flex:L= 5/5 [] R= 5-/5   AbdL= 5/5 [] R= 4+/5  AddL= 5/5 [] R= 4+/5  ExtL= 4/5 [] R= 4/5        Below from eval-----------------------------------  Lumbar ROM  Flex=WNL  Ext=MOD  Side bend: L=MOD [] R=MOD  Rotation:    L=WNL [] R=WNL     Flexibility  Hamstrings: WNL  Piriformis: MOD  Quads: MIN  Hip flexors: MIN     Strength  Abdominals= Poor  Bridge = Good      Hip ROM  Flex:L= 109 [] R=  100   AbdL= 30  [] R= 35   ExtL= 7  [] R= 4   IRL= WFL  [] R= WFL   ERL= 40  [] R= 25      Hip Strength MMT  Flex:L= 5/5 [] R= 4/5   AbdL= 4+/5 [] R= 4/5  AddL= 5/5 [] R= 4/5  ExtL= 4/5 [] R= 4/5    Treatments:  Stepper L3 x5'   Biodex balance - L12 14%, 19%  Hip loop abd/ext/flex 3x10 elly RTB - held per patient reports   LS flexion seated 5\" x10   Inch worm RTB 20' x2 laps (Added to HEP)    Monster walk RTB  x2 ea - felt it going fwd   DBE 2' " "x2 way   Tandem walk in // bar 3 laps  (fwd/back)   SLS on AIREX 1' elly   Tandem 6 KB swing 4kg x10 cw/ccw   BOSU Runner step up 6\" x20 elly   Alt hip flex isometric 5\" x20 (Added to HEP)      Leg press 120#  2x15      Fig 4 1' elly   Piriformis stretch 1' elly         Plan: Access Code: [email or web address]     Charges 2 TE 1 NME      - PT Therapeutic Procedures Time Entry  Neuromuscular Re-Education Time Entry: 10  Therapeutic Exercise Time Entry: 35 -       Assessment:    Tolerated inch worms better than loop abduction so added to hep.  Retro walk for monster walks was fine but had slight pain with fwd movement.       Plan:    Continue per POC to improve ROM, flexibility and strength to decrease pain and allow return to prior level of function.  1 visits remaining per extended POC.        Irvin Palacios, PT  "

## 2024-12-17 ENCOUNTER — TREATMENT (OUTPATIENT)
Dept: PHYSICAL THERAPY | Facility: CLINIC | Age: 59
End: 2024-12-17
Payer: COMMERCIAL

## 2024-12-17 DIAGNOSIS — M16.11 OSTEOARTHRITIS OF RIGHT HIP, UNSPECIFIED OSTEOARTHRITIS TYPE: ICD-10-CM

## 2024-12-17 DIAGNOSIS — M25.551 CHRONIC RIGHT HIP PAIN: ICD-10-CM

## 2024-12-17 DIAGNOSIS — G89.29 CHRONIC RIGHT HIP PAIN: ICD-10-CM

## 2024-12-17 PROCEDURE — 97110 THERAPEUTIC EXERCISES: CPT | Mod: GP | Performed by: PHYSICAL THERAPIST

## 2024-12-17 ASSESSMENT — PAIN - FUNCTIONAL ASSESSMENT: PAIN_FUNCTIONAL_ASSESSMENT: 0-10

## 2024-12-17 ASSESSMENT — PAIN SCALES - GENERAL: PAINLEVEL_OUTOF10: 0 - NO PAIN

## 2024-12-17 NOTE — PROGRESS NOTES
Physical Therapy  Physical Therapy Treatment    Patient Name: Delfino Lopez  MRN: [id number]  Today's Date: 12/17/2024  Time Calculation  Start Time: 1630  Stop Time: 1715  Time Calculation (min): 45 min    Visit # 11   Reason for visit: Right hip pain   Referring MD: Liu Maxwell  Insurance: AUTH NEEDED / NO OOP / 30 VISITS / [employer]   DX: Chronic right hip pain, OA right hip,   POC: 1-2/week 6-8 weeks     Current Problem  1. Osteoarthritis of right hip, unspecified osteoarthritis type  Follow Up In Physical Therapy      2. Chronic right hip pain  Follow Up In Physical Therapy            General  Reason for Referral: Right hip pain  Referred By: Liu Maxwell  Precautions  Precautions  STEADI Fall Risk Score (The score of 4 or more indicates an increased risk of falling): 3  Precautions Comment: none  Pain  Pain Assessment: 0-10  0-10 (Numeric) Pain Score: 0 - No pain    Subjective:   Subjective   Current pain 0/10.  I did have some left leg pain after our last session.  The right does still hurt me at times depending on what I am doing.    Since starting therapy feels that he is better, overall reports 50-60% improvement.    I can do stairs normally, but it does depend on how I am feeling at any particular time.    HEP compliance -yes     Objective:   Objective   Gait- w/o device mild antalgic     SLS:  L=30 R= 30    Lateral dip = w/o valgus     Hip ROM  Flex: L= 110 [] R=  110   Abd L= 30  [] R= 30   Ext L= 5  [] R= 6   IR L= WFL  [] R= WFL   ER L= 40  [] R= 40     Hip Strength MMT  Flex: L= 5/5 [] R= 5/5   Abd L= 5/5 [] R= 5-/5  Add L= 5/5 [] R= 4+/5  Ext L= 5-/5 [] R= 5-/5     LEFS 45/80 (41 at eval)      Below from eval-----------------------------------  Lumbar ROM  Flex=WNL  Ext=MOD  Side bend: L=MOD [] R=MOD  Rotation:    L=WNL [] R=WNL     Flexibility  Hamstrings: WNL  Piriformis: MOD  Quads: MIN  Hip flexors: MIN     Strength  Abdominals= Poor  Bridge = Good      Hip ROM  Flex:L= 109 [] R=  100   AbdL= 30  " [] R= 35   ExtL= 7  [] R= 4   IRL= WFL  [] R= WFL   ERL= 40  [] R= 25      Hip Strength MMT  Flex:L= 5/5 [] R= 4/5   AbdL= 4+/5 [] R= 4/5  AddL= 5/5 [] R= 4/5  ExtL= 4/5 [] R= 4/5    Treatments:  Stepper L3 x5'   Isometric add w/ ball 5\" x10   LS flexion seated 5\" x10   Inch worm RTB 20' x2 laps (Added to HEP)    Monster walk RTB  x2 ea - felt it going fwd   Tandem walk in // bar 3 laps  (fwd/back)   SLS 30\" elly   Tandem 6 KB swing 4kg x10 cw/ccw   Lateral step up 6\" x20 elly (Added to HEP)    Alt hip flex isometric 5\" x30   Heel walk / toe walk x3 ea in // bar         Plan: Access Code: [email or web address]     Charges 3 TE     - PT Therapeutic Procedures Time Entry  Therapeutic Exercise Time Entry: 45 -       Assessment:    Delfino has met or mostly met most goals set at evaluation.  Discussed continuing HEP and following up with Ortho if right hip continues to give him trouble.    Active       PT Problem       PT Goal 1 (Met)       Start:  09/10/24    Expected End:  10/08/24    Resolved:  12/17/24    Dec pain 50% to improve mobility and function -goal met   Patient will demonstrate good understanding and compliance with HEP -goal met          PT Goal 2 (Adequate for Discharge)       Start:  09/10/24    Expected End:  11/05/24       Increase LEFS score by 9 points - progress but not met   Right hip ROM = left -goal met   Right hip strength 4+/5 -goal met   SLS 20 sec to improve balance and stability -goal met   Lat dip w/o valgus collapse -goal met   Decrease pain to  0-1/10 -goal met   Will ascend/descend stairs with normalized pattern -goal met                  Plan:    Discharge to [facility] at this time secondary to goals mostly met.         Irvin Palacios, PT  "

## 2024-12-28 RX ORDER — LISINOPRIL AND HYDROCHLOROTHIAZIDE 10; 12.5 MG/1; MG/1
1.5 TABLET ORAL DAILY
Qty: 135 TABLET | Refills: 0 | Status: SHIPPED | OUTPATIENT
Start: 2024-12-28

## 2024-12-31 LAB — NONINV COLON CA DNA+OCC BLD SCRN STL QL: NEGATIVE

## (undated) DEVICE — TROCAR, STRUCTURAL BALLOON, SPACEMAKER

## (undated) DEVICE — SUTURE, VICRYL, 4-0, 18 IN, PS2, UNDYED

## (undated) DEVICE — SHEAR, W/UNIPOLAR CAUTERY, ENDOSHEAR, 5 MM

## (undated) DEVICE — SCOPE WARMER, LAPAROSCOPE, BAG ONLY, LF

## (undated) DEVICE — BALLOON, PREPERITONEAL, DISSECTOR, KIDNEY, SPACEMAKER

## (undated) DEVICE — SUTURE, VICRYL, 0, 27 IN, UR-6, VIOLET

## (undated) DEVICE — STRIP, SKIN CLOSURE, STERI STRIP, REINFORCED, 0.5 X 4 IN

## (undated) DEVICE — TUBE SET, PNEUMOLAR HEATED, SMOKE EVACU, HIGH-FLOW

## (undated) DEVICE — ABSORBATACK, 5MM, SINGLE USE/W 15 ABSORBABLE TACKS

## (undated) DEVICE — SLEEVE, KII, Z-THREAD, 5X100CM